# Patient Record
Sex: FEMALE | Race: WHITE | URBAN - METROPOLITAN AREA
[De-identification: names, ages, dates, MRNs, and addresses within clinical notes are randomized per-mention and may not be internally consistent; named-entity substitution may affect disease eponyms.]

---

## 2024-04-08 ENCOUNTER — TELEPHONE (OUTPATIENT)
Dept: OBGYN CLINIC | Age: 24
End: 2024-04-08

## 2024-04-08 NOTE — TELEPHONE ENCOUNTER
Received a call from patient's mother stating that patient is in the ER at [facility] and they are wanting to do a CT scan to check for a blood clot based on her labs. Informed mother that decisions about the patient's care should be made by her providers at that facility and their OBGYN since they have all of the information and that since we do not have all the information we are not able to recommend a treatment. Mother verbalized understanding.

## 2024-04-09 ENCOUNTER — ROUTINE PRENATAL (OUTPATIENT)
Dept: OBGYN CLINIC | Age: 24
End: 2024-04-09

## 2024-04-09 VITALS — DIASTOLIC BLOOD PRESSURE: 78 MMHG | SYSTOLIC BLOOD PRESSURE: 119 MMHG

## 2024-04-09 DIAGNOSIS — O99.212 OBESITY AFFECTING PREGNANCY IN SECOND TRIMESTER, UNSPECIFIED OBESITY TYPE: ICD-10-CM

## 2024-04-09 DIAGNOSIS — R82.71 GBS BACTERIURIA: ICD-10-CM

## 2024-04-09 DIAGNOSIS — R55 SYNCOPE, UNSPECIFIED SYNCOPE TYPE: ICD-10-CM

## 2024-04-09 DIAGNOSIS — J45.909 ASTHMA AFFECTING PREGNANCY IN SECOND TRIMESTER: ICD-10-CM

## 2024-04-09 DIAGNOSIS — O99.340 ANTEPARTUM MENTAL DISORDERS OF MOTHER: ICD-10-CM

## 2024-04-09 DIAGNOSIS — O09.92 HIGH-RISK PREGNANCY IN SECOND TRIMESTER: Primary | ICD-10-CM

## 2024-04-09 DIAGNOSIS — Z82.79 FAMILY HISTORY OF CONGENITAL HEART DEFECT: ICD-10-CM

## 2024-04-09 DIAGNOSIS — Z3A.20 20 WEEKS GESTATION OF PREGNANCY: ICD-10-CM

## 2024-04-09 DIAGNOSIS — O21.9 NAUSEA AND VOMITING DURING PREGNANCY: ICD-10-CM

## 2024-04-09 DIAGNOSIS — F43.10 PTSD (POST-TRAUMATIC STRESS DISORDER): ICD-10-CM

## 2024-04-09 DIAGNOSIS — O99.512 ASTHMA AFFECTING PREGNANCY IN SECOND TRIMESTER: ICD-10-CM

## 2024-04-09 DIAGNOSIS — F84.5 ASPERGER'S DISORDER: ICD-10-CM

## 2024-04-09 PROBLEM — F25.9 SCHIZOAFFECTIVE DISORDER (HCC): Status: RESOLVED | Noted: 2024-02-27 | Resolved: 2024-04-09

## 2024-04-09 RX ORDER — DIPHENHYDRAMINE HCL 25 MG
25 CAPSULE ORAL EVERY 6 HOURS PRN
COMMUNITY

## 2024-04-09 ASSESSMENT — PATIENT HEALTH QUESTIONNAIRE - PHQ9
1. LITTLE INTEREST OR PLEASURE IN DOING THINGS: SEVERAL DAYS
SUM OF ALL RESPONSES TO PHQ QUESTIONS 1-9: 1
2. FEELING DOWN, DEPRESSED OR HOPELESS: NOT AT ALL
SUM OF ALL RESPONSES TO PHQ9 QUESTIONS 1 & 2: 1
SUM OF ALL RESPONSES TO PHQ QUESTIONS 1-9: 1

## 2024-04-09 NOTE — PROGRESS NOTES
[facility] MATERNAL FETAL MEDICINE    [address] [place] [address]  P- [phone]  [phone]         MFM Consultation    Presents for evaluation of the following chief complaint(s):   Chief Complaint   Patient presents with    High Risk Pregnancy     Anxiety/Depression, Asperger's disorder, Asthma, BMI > 40, FH CHD, PTSD, Schizoaffective disorder         Yancy Cornejo (2000) is a 24 y.o.  at 20w4d with 2024, by Last Menstrual Period.     Patient is working part time at [employer].     Patients , José Manuel and her Mother, Anna Marie is present today.     Personal and family history reviewed and updated as indicated.     Pt is scheduled to see Primary OB ([facility]) on 24.    Reports HA's; daily- does not respond to Tylenol. Denies Edema. Denies Pre-eclamptic symptoms. No bleeding or LOF.  No contractions or cramping. No vaginal pressure recently. Reports good fetal movement. Reports constipation, recommendations reviewed.      Mood evaluated today based on discussion with pt and PHQ screen.      2024     5:11 PM   PHQ-9    Little interest or pleasure in doing things 1   Feeling down, depressed, or hopeless 0   PHQ-2 Score 1   PHQ-9 Total Score 1      Mood Reassuring today  As mood stable and depression/anxiety well-controlled, will not change medications today.    Addressed normal pregnancy complaints, reassured and offered suggestions for care  Reviewed gestational age precautions and activity goals/limitations  Nutritional counseling as well as specific goals based on current maternal and fetal status  Options for GERD, constipation, other common complaints reviewed.   Reviewed gestational age appropriate preventive care regarding communicable disease transmission and vaccines as appropriate (including flu, TDaP >28wk, RSV 32-36wk, and COVID.)  Mood counseling today      Vitals:    24 1709   BP: 119/78      Physical Exam  Applicable labs reviewed.   Please

## 2024-04-09 NOTE — PATIENT INSTRUCTIONS
If you have headaches in pregnancy-   Consider these  over the counter options (tylenol, caffeine, hydration, benadryl, mag oxide up to 800mg BID, riboflavin 400mg daily; angle-relief, excedrin migraine, allergy medications).     Other non-pharmacologic solutions - BeKool-type head patches, essential oils, dark room, warm compresses, mouthguard if jaw clenching/teeth grinding.    If no help, reach out to your OB or primary care practice.     It is helpful, to keep a headache calendar:  One idea is the abbey \"Migraine Deandre\" to log your headaches. You may find others you like better.     www.headachereliefguide.com for more information on headaches and interactive tools and helpful calculators including those to figure out how much water you need and evaluating your sleep score!    Watch out for headache red flags including headaches that wake you from sleep, early morning vomiting, immediate onset and 'worst headache of life', or headache associated with neurologic problem such as weakness of a limb, slurred speech, or facial droop.         Resources for Depression/Anxiety  Postpartum Support International (PSI).    PSI Warmline:  3-355-509-4PPD (1671).  WWW.POSTPARTUM.NET    Mom's IMPACTT  [email or web address]       In order to optimize maternal, fetal, and  health, we recommend the following vaccinations.   Flu- yearly (https://www.highriskpregnancyinfo.org/flu-facts-for-pregnancy)  Consider Covid vaccination/booster (https://www.highriskpregnancyinfo.org/covid-19-pregnancy)  TDaP after 28 weeks each pregnancy (https://www.highriskpregnancyinfo.org/tdap)  Consider RSV vaccine 32-36 weeks of pregnancy, if Sept- January.  (https://www.highriskpregnancyinfo.org/rsv)

## 2024-04-09 NOTE — ASSESSMENT & PLAN NOTE
Congenital heart defects occur in approximately 1% of pregnancies.  Congenital heart defects may occur due to multifactorial influences, chromosomal abnormalities, genetic syndromes or environmental exposures.  Isolated heart defects are generally multifactorial.  The overall prognosis is dependent on the severity of the heart defect, and whether or not it is due to an underlying chromosome or genetic problem.  Chromosomal and syndromic etiologies may be associated with other birth defects and mental retardation.  Risk for recurrence depends on etiology.    
 Low dose Aspirin  mg daily is recommended to be started at 12-16 weeks (some benefit seen with starting up to 28 weeks) for the prevention of preeclampsia  in high risk women. Consider stopping Aspirin at 36 weeks.  Recommend Vitamin D 2000IU daily and Calcium 1000mg daily to aid in protection of bones and teeth.  Recommend use of PNV daily with well-balanced diet.  Unless instructed otherwise, recommend continuation of physical activity throughout pregnancy.       Genetic counseling was performed by physician after reviewing patient's genetic history.    The patient's Down syndrome age associated risk, as well as, risks of additional aneuploidy and genetic syndromes, are reduced by approximately 50% with a normal anatomy ultrasound. Ultrasound alone does not rule out all abnormalities of genetics and development.     Maternal serum screening for aneuploidy was discussed with the patient including first trimester BELKYS-A/hCG, second trimester Quad screen (either in isolation or sequential with BELKYS-A) as well as non-invasive prenatal testing (NIPT) for aneuploidy from a maternal blood sample.  Positive predictive and negative predictive values for these tests were explained, questions answered. Patient understands that these are screening tests that only assesses risk for select abnormalities (trisomies 13, 18, and 21, and sex chromosome abnormalities (NIPT), as well as markers for placental health (BELKYS-A) and risk for open neural tube defects (quad)).  NIPT is designed for high risk populations, but should be considered by all patients who desire the current best option for screening for applicable genetic abnormalities.     Limitations of technology discussed based on maternal age, technical aspects of tests, and maternal BMI reviewed.  All questions answered and concerns discussed.     Patient elected to proceed with Ultrasound only with no serum screening.      
Asthma in pregnancy can be potentially life-threatening to mother and fetus.  Often, asthma will worsen in early third trimester. Recommend close monitoring and preventative therapy and aggressive treatment of exacerbations.      Patients with mild or well-controlled asthma will use an inhaled Beta-agonist (Albuterol type inhaler) prn or every 4-6 hours to control asthma. If patient requires use of rescue inhaler more than 2 times per week, worsening pulmonary function should be treated with the addition of an inhaled corticosteroid (ex. Flovent) BID.    For severe asthma or significant worsening of condition on the first 2 combined medications, patient should be assessed by her primary care physician. However, oral Prednisone 60 mgs daily for 1 week followed by a 10 day taper may be used.      Recommendation to decrease asthma triggers; Optimize seasonal allergy control.      testing twice weekly is recommended for severe asthma beginning at 32 weeks with growth assessment each month.     Avoid hemabate for treatment of uterine atony/pp hemorrhage.    
Preconception BMI ? 30 increases risk for pregnancy complications, including gestational diabetes, poor or accelerated fetal growth, hypertensive disorders of pregnancy, and abnormal labor progression. In addition, there is an increased risk of fetal demise, as well as congenital anomalies including neural tube defects, cardiac malformations, orofacial defects, and limb reduction abnormalities.     The risk for stillbirth increases with increasing obesity: class I obesity 1.3 [1.2-1.4], class II obesity 1.4 [1.3-1.6], class III obesity 1.9 [1.3-1.6]) and higher stillbirth risk in  obese women (1.9 [1.7-2.1]) than in  obese women (1.4 [1.3-1.5]). Among women with class III obesity (BMI ?40 kg/m2), the risk for stillbirth increased with advancing gestational age: 30 to 33 weeks, hazard and risk ratios 1.40 and 1.69, respectively; 37 to 39 weeks, hazard and risk ratios 3.20 and 2.95, respectively; and 40 to 42 weeks, hazard and risk ratios 3.30 and 8.95, respectively.           Recommend detailed first trimester ultrasound with NT at 12-13 weeks.   Recommend level II ultrasound for anatomy and fetal echo if prepregnancy BMI >30-35 based on AIUM guidelines.   Consider  testing beginning at 32-36 weeks due to risks of fetal demise, timing dependent on maternal and fetal comorbidities.   Early evaluation of insulin resistance with HgbA1c at initiation of care- if a1c > 5.5, then follow up with either \"2 step\" 1hr GCT/ 3hr GTT OR \"1 step\" 2hr GTT  Closely monitor blood pressure for development/worsening of hypertensive disorders of pregnancy.  Weight Gain Goal: <15 pounds, it is ok to stay same weight or lose as long as baby growing well  Dietary choices- low carb fine, avoid extreme keto (goal >50-75gm carb/day); not to use intermittent/prolonged fasting without specific discussion with physician.   Continue activity/exercise     The American College of Obstetricians and Gynecologists 
risk of  complications, and tapering or stopping antidepressants can increase the maternal risk of  relapse. It is generally agreed the risks of  depression (especially recurrent episodes) exceed the risks of  complications.     It is important to continue to monitor mood in the  period, as more than 20% women will struggle with depression or other mood issues in pregnancy/postpartum. Those with a history will be at a much higher risk for exacerbation with the hormonal fluctuations of this period.     Resources shared both in visit and by Ernestine

## 2024-04-10 ENCOUNTER — TELEPHONE (OUTPATIENT)
Dept: OBGYN CLINIC | Age: 24
End: 2024-04-10

## 2024-04-10 NOTE — TELEPHONE ENCOUNTER
Patient called and stated that she has been passing out multiples times a day and that she has been seen in the ER multiple times. She wants to have time off work for it. Passed her message along to Dr Salmon.

## 2024-04-10 NOTE — TELEPHONE ENCOUNTER
Called patient and informed her of Dr Salmon's response:  \"I can't do a get out of work for the forseeable future letter but I am willing to give her a letter for this week and see how she does. If symptoms continue will need a followup visit sooner.\"  Patient verbalizes understanding. Letter sent to patient.

## 2024-04-15 ENCOUNTER — TELEPHONE (OUTPATIENT)
Dept: OBGYN CLINIC | Age: 24
End: 2024-04-15
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
## 2024-04-15 NOTE — TELEPHONE ENCOUNTER
Patient called and stated that she is still passing out. Scheduled for a problem visit with Dr Salmon.

## 2024-04-16 ENCOUNTER — OFFICE VISIT (OUTPATIENT)
Dept: OBGYN CLINIC | Age: 24
End: 2024-04-16

## 2024-04-16 VITALS
HEART RATE: 105 BPM | BODY MASS INDEX: 39.94 KG/M2 | SYSTOLIC BLOOD PRESSURE: 106 MMHG | WEIGHT: 240 LBS | DIASTOLIC BLOOD PRESSURE: 74 MMHG | OXYGEN SATURATION: 95 %

## 2024-04-16 DIAGNOSIS — Z3A.21 21 WEEKS GESTATION OF PREGNANCY: ICD-10-CM

## 2024-04-16 DIAGNOSIS — R55 SYNCOPE, UNSPECIFIED SYNCOPE TYPE: ICD-10-CM

## 2024-04-16 DIAGNOSIS — O21.9 NAUSEA AND VOMITING DURING PREGNANCY: ICD-10-CM

## 2024-04-16 DIAGNOSIS — O09.92 HIGH-RISK PREGNANCY IN SECOND TRIMESTER: Primary | ICD-10-CM

## 2024-04-16 LAB
ALBUMIN SERPL-MCNC: 3.2 G/DL (ref 3.5–5)
ALBUMIN/GLOB SERPL: 0.8 (ref 0.4–1.6)
ALP SERPL-CCNC: 56 U/L (ref 50–136)
ALT SERPL-CCNC: 14 U/L (ref 12–65)
ANION GAP SERPL CALC-SCNC: 4 MMOL/L (ref 2–11)
AST SERPL-CCNC: 14 U/L (ref 15–37)
BILIRUB SERPL-MCNC: 0.2 MG/DL (ref 0.2–1.1)
BUN SERPL-MCNC: 7 MG/DL (ref 6–23)
CALCIUM SERPL-MCNC: 9.5 MG/DL (ref 8.3–10.4)
CHLORIDE SERPL-SCNC: 108 MMOL/L (ref 103–113)
CO2 SERPL-SCNC: 25 MMOL/L (ref 21–32)
CREAT SERPL-MCNC: 0.6 MG/DL (ref 0.6–1)
ERYTHROCYTE [DISTWIDTH] IN BLOOD BY AUTOMATED COUNT: 13.5 % (ref 11.9–14.6)
GLOBULIN SER CALC-MCNC: 3.8 G/DL (ref 2.8–4.5)
GLUCOSE SERPL-MCNC: 102 MG/DL (ref 65–100)
HCT VFR BLD AUTO: 35.8 % (ref 35.8–46.3)
HGB BLD-MCNC: 12.3 G/DL (ref 11.7–15.4)
MAGNESIUM SERPL-MCNC: 1.8 MG/DL (ref 1.8–2.4)
MCH RBC QN AUTO: 31.2 PG (ref 26.1–32.9)
MCHC RBC AUTO-ENTMCNC: 34.4 G/DL (ref 31.4–35)
MCV RBC AUTO: 90.9 FL (ref 82–102)
NRBC # BLD: 0 K/UL (ref 0–0.2)
PLATELET # BLD AUTO: 376 K/UL (ref 150–450)
PMV BLD AUTO: 10.2 FL (ref 9.4–12.3)
POTASSIUM SERPL-SCNC: 3.8 MMOL/L (ref 3.5–5.1)
PROT SERPL-MCNC: 7 G/DL (ref 6.3–8.2)
RBC # BLD AUTO: 3.94 M/UL (ref 4.05–5.2)
SODIUM SERPL-SCNC: 137 MMOL/L (ref 136–146)
WBC # BLD AUTO: 11.1 K/UL (ref 4.3–11.1)

## 2024-04-16 PROCEDURE — 0502F SUBSEQUENT PRENATAL CARE: CPT | Performed by: NURSE PRACTITIONER

## 2024-04-19 ENCOUNTER — OFFICE VISIT (OUTPATIENT)
Dept: NEUROLOGY | Age: 24
End: 2024-04-19
Payer: COMMERCIAL

## 2024-04-19 ENCOUNTER — OFFICE VISIT (OUTPATIENT)
Dept: NEUROLOGY | Age: 24
End: 2024-04-19

## 2024-04-19 ENCOUNTER — INITIAL CONSULT (OUTPATIENT)
Age: 24
End: 2024-04-19
Payer: COMMERCIAL

## 2024-04-19 VITALS
BODY MASS INDEX: 40.45 KG/M2 | WEIGHT: 242.8 LBS | HEIGHT: 65 IN | SYSTOLIC BLOOD PRESSURE: 108 MMHG | DIASTOLIC BLOOD PRESSURE: 64 MMHG | HEART RATE: 100 BPM

## 2024-04-19 VITALS
DIASTOLIC BLOOD PRESSURE: 75 MMHG | BODY MASS INDEX: 39.94 KG/M2 | HEART RATE: 108 BPM | WEIGHT: 240 LBS | OXYGEN SATURATION: 97 % | SYSTOLIC BLOOD PRESSURE: 105 MMHG

## 2024-04-19 DIAGNOSIS — R55 SYNCOPE, UNSPECIFIED SYNCOPE TYPE: Primary | ICD-10-CM

## 2024-04-19 PROCEDURE — G8419 CALC BMI OUT NRM PARAM NOF/U: HCPCS | Performed by: PSYCHIATRY & NEUROLOGY

## 2024-04-19 PROCEDURE — G8427 DOCREV CUR MEDS BY ELIG CLIN: HCPCS | Performed by: INTERNAL MEDICINE

## 2024-04-19 PROCEDURE — 1036F TOBACCO NON-USER: CPT | Performed by: INTERNAL MEDICINE

## 2024-04-19 PROCEDURE — G8419 CALC BMI OUT NRM PARAM NOF/U: HCPCS | Performed by: INTERNAL MEDICINE

## 2024-04-19 PROCEDURE — G8427 DOCREV CUR MEDS BY ELIG CLIN: HCPCS | Performed by: PSYCHIATRY & NEUROLOGY

## 2024-04-19 PROCEDURE — 99204 OFFICE O/P NEW MOD 45 MIN: CPT | Performed by: INTERNAL MEDICINE

## 2024-04-19 PROCEDURE — 99204 OFFICE O/P NEW MOD 45 MIN: CPT | Performed by: PSYCHIATRY & NEUROLOGY

## 2024-04-19 PROCEDURE — 1036F TOBACCO NON-USER: CPT | Performed by: PSYCHIATRY & NEUROLOGY

## 2024-04-19 ASSESSMENT — ENCOUNTER SYMPTOMS
ALLERGIC/IMMUNOLOGIC NEGATIVE: 1
EYES NEGATIVE: 1
RESPIRATORY NEGATIVE: 1
GASTROINTESTINAL NEGATIVE: 1

## 2024-04-19 NOTE — PROGRESS NOTES
ELECTROENCEPHALOGRAM FOR [facility] NEUROLOGY    EEG: Routine  Pt Class: Outpatient    DATE(s) OF EE24  DATE OF REPORT: 24    MRN: [id number]  YOB: 2000  EEG No.   ICD-10: R55 - Syncope and collapse  CPT Code: 80861 (20-40 minutes, awake and drowsy)  CSN: [id number]    HISTORY: syncope vs seizure    MEDICATIONS THAT COULD AFFECT EEG: no ASM    TECHNICAL SUMMARY  This is a digital EEG recorded with all input channels reviewed with bipolar and referential montages using the modified combinatorial system nomenclature.    DESCRIPTION OF RECORD AND EVENTS  During the maximally alert state a 11-12 Hz posterior dominant rhythm was seen that was symmetric, reactive to eye opening and well regulated. More anteriorly, low voltage frontocentral beta predominated. Minimal myogenic artifact was seen with corresponding movements. Drowsiness was characterized by alpha attenuation and increased symmetric frontocentral theta activity. Vertex sharp transients were seen. Stage 2 sleep was not reached.     HV: Hyperventilation was not performed.     PHOTIC STIMULATION: Photic stimulation was done from 1-21 Hz; photic driving was seen; photoparoxysmal responses were absent.    ELECTROCARDIOGRAM: Normal Sinus Rhythm, SpO2 97    IMPRESSION: Normal EEG in Awake and Drowsy states.    CLINICAL CORRELATION: This EEG is normal for a patient of this age during awake and drowsy states. There are no asymmetries, epileptiform discharges or electrographic seizures.     An EEG without epileptiform discharges does not exclude the possibility of epilepsy. If the clinical suspicion of epilepsy remains, consider additional EEG recordings.      Michelet Torrez MD  [facility] Neurology  [address], [address]  [place] [address]  Phone: [phone]  Fax: [phone]

## 2024-04-19 NOTE — PROGRESS NOTES
[facility] NEUROLOGY  [address], [address]  [place] [address]  Phone: [phone] Fax [phone]  Dr. Jairo Bell      4/19/2024  Yancy Cornejo     Patient is referred by the following provider for consultation regarding as below:       I reviewed the available records and notes and have examined patient with the following findings:     Chief Complaint:  No chief complaint on file.         HPI: This is a right handed 24 y.o.  female who is currently 22 weeks pregnant and carries a history of PTSD as a Buerger's syndrome syncope as a teenager schizoaffective disorder.    This patient does have a history of having recurrent episodes of syncope as a child that were very similar to these episodes.  Currently she has been passing out about every 1 or 2 times per day all of these episodes seem to be about the same where she states that she gets stars in her eyes then she kind of blacks out or loses her vision but she is still conscious and then goes into syncope and passes out.  At no time has she had any seizure activity at no time as she lost bowel or bladder there is no postictal state.  No witnessed seizure activity.  Though her mother is here with her today and she does this once or twice a day her mom has not seen any of these episodes except when she was a teenager.  Her  does see these and is frequently with her and tries to shake her awake legs or down puts her feet up.  But he has not seen any seizure activity.  There is a family history of an aunt on her mother side and her grandmother with seizures.  But those were febrile.  There was a question of narcolepsy but the patient really does not describe excessive daytime hypersomnia, she does not describe typical cataplexy events.  There is no hypnagogic or hypnopompic hallucinations.  She has had a history of hallucinations in the past.  There is no sleep paralysis or sleep attacks.  Certainly could be considered atypical but

## 2024-04-23 ENCOUNTER — ROUTINE PRENATAL (OUTPATIENT)
Dept: OBGYN CLINIC | Age: 24
End: 2024-04-23

## 2024-04-23 VITALS — SYSTOLIC BLOOD PRESSURE: 120 MMHG | WEIGHT: 240.8 LBS | DIASTOLIC BLOOD PRESSURE: 78 MMHG | BODY MASS INDEX: 40.07 KG/M2

## 2024-04-23 DIAGNOSIS — R55 SYNCOPE, UNSPECIFIED SYNCOPE TYPE: ICD-10-CM

## 2024-04-23 DIAGNOSIS — O99.212 OBESITY AFFECTING PREGNANCY IN SECOND TRIMESTER, UNSPECIFIED OBESITY TYPE: ICD-10-CM

## 2024-04-23 DIAGNOSIS — F84.5 ASPERGER'S DISORDER: ICD-10-CM

## 2024-04-23 DIAGNOSIS — O09.92 HIGH-RISK PREGNANCY IN SECOND TRIMESTER: Primary | ICD-10-CM

## 2024-04-23 PROCEDURE — 0502F SUBSEQUENT PRENATAL CARE: CPT | Performed by: OBSTETRICS & GYNECOLOGY

## 2024-04-23 NOTE — PROGRESS NOTES
OB return  Yancy Cornejo is a 24 y.o. female   with 22w4d IUP with Estimated Date of Delivery: 24 presenting to the clinic as a routine OB.   Denies any complaints.    Problem list reviewed and updated    Pregnancy complicated by:  1. Asperger's disorder  2. Schizoaffective - managed by psychiatry and therapy, Zoloft 100mg and Vraylar 4.5mg  3. Insomnia- takes hydroxyzine, recommend to switch to benadryl or unisom  4. GBS bacteruria  5. Obesity- BMI 40, MFM referral  6. Syncope- referral to cardiology and neurology, hx of normal sleep study as a teenager, workup including EEG and holter monitor  7. Family hx of congenital heart defects- with 1st paternal cousin with CHD, required surgery, normal anatomy echo but limited  8. Asthma- allergy and exercise induced, albuterol inhaler prn    Physical Examination:  /78   Wt 109.2 kg (240 lb 12.8 oz)   LMP 2023   BMI 40.07 kg/m²    Gen: AAOx3  Ab: soft, NTTP, gravid uterus  Skin: no edema    Plan:  --RTC 4 weeks with glucola

## 2024-04-29 ASSESSMENT — ENCOUNTER SYMPTOMS
SHORTNESS OF BREATH: 0
ABDOMINAL PAIN: 0

## 2024-04-29 NOTE — PROGRESS NOTES
[facility] CARDIOLOGY  [address] [place] [address], [address]  [place] [address]  PHONE: [phone]      24    NAME:  Yancy Cornejo  : 2000  MRN: [id number]         SUBJECTIVE:   Yancy Cornejo is a 24 y.o. female seen for a consultation visit regarding the following:     Chief Complaint   Patient presents with    Consultation      Syncope, unspecified syncope type  21 weeks gestation of pregnancy                HPI:  Consultation is requested by Kevin Valerio MD for evaluation of Consultation ( Syncope, unspecified syncope type/21 weeks gestation of pregnancy//)   .    Ms. Cornejo presents today for follow-up.  She  presents today for evaluation of syncope.  Patient had a recent episode of syncope while she was at work, lost consciousness.  Was not associated with an abrupt postural change.  She has had significant dizziness and lightheadedness recently.  She has had syncope previously, not associate with pregnancy although it has been sometime.  She notes since has been in her second trimester pregnancy, her blood pressure has been lower, heart rates been little elevated.  She otherwise has no worrisome symptoms or complaints today.  She is a non-smoker.  Has not drinking any caffeine.  She is on no antihypertensive medications.  No significant family history of heart disease.                Past Medical History, Past Surgical History, Family history, Social History, and Medications were all reviewed with the patient today and updated as necessary.     Prior to Admission medications    Medication Sig Start Date End Date Taking? Authorizing Provider   Prenatal Vit-Fe Fumarate-FA (PRENATAL PO) Take by mouth   Yes Kathie Guzman MD   diphenhydrAMINE (BENADRYL) 25 MG capsule Take 1 capsule by mouth every 6 hours as needed for Itching   Yes Kathie Guzman MD   famotidine (PEPCID) 20 MG tablet Take 1 tablet by mouth 2 times daily 24  Yes Ceci Benítez MD   ondansetron (ZOFRAN) 4 MG

## 2024-05-07 ENCOUNTER — TELEPHONE (OUTPATIENT)
Dept: OBGYN CLINIC | Age: 24
End: 2024-05-07

## 2024-05-07 ENCOUNTER — HOSPITAL ENCOUNTER (EMERGENCY)
Age: 24
Discharge: HOME | End: 2024-05-07
Payer: COMMERCIAL

## 2024-05-07 ENCOUNTER — APPOINTMENT (OUTPATIENT)
Dept: ULTRASOUND IMAGING | Age: 24
End: 2024-05-07
Payer: COMMERCIAL

## 2024-05-07 VITALS
WEIGHT: 240 LBS | SYSTOLIC BLOOD PRESSURE: 116 MMHG | DIASTOLIC BLOOD PRESSURE: 73 MMHG | TEMPERATURE: 98.4 F | HEIGHT: 65 IN | BODY MASS INDEX: 39.99 KG/M2 | RESPIRATION RATE: 27 BRPM | OXYGEN SATURATION: 99 % | HEART RATE: 90 BPM

## 2024-05-07 DIAGNOSIS — M79.662 PAIN OF LEFT CALF: Primary | ICD-10-CM

## 2024-05-07 LAB
ALBUMIN SERPL-MCNC: 3.5 G/DL (ref 3.5–5)
ALBUMIN/GLOB SERPL: 1.3 (ref 0.4–1.6)
ALP SERPL-CCNC: 72 U/L (ref 45–117)
ALT SERPL-CCNC: 10 U/L (ref 13–61)
ANION GAP SERPL CALC-SCNC: 15 MMOL/L (ref 2–11)
APPEARANCE UR: ABNORMAL
AST SERPL-CCNC: 17 U/L (ref 15–37)
BASE DEFICIT BLDV-SCNC: 1.1 MMOL/L
BASOPHILS # BLD: 0 K/UL (ref 0–0.2)
BASOPHILS NFR BLD: 0 % (ref 0–2)
BILIRUB SERPL-MCNC: 0.3 MG/DL (ref 0.2–1.1)
BILIRUB UR QL: NEGATIVE
BUN SERPL-MCNC: 5 MG/DL (ref 6–23)
CALCIUM SERPL-MCNC: 9.4 MG/DL (ref 8.3–10.4)
CHLORIDE SERPL-SCNC: 103 MMOL/L (ref 98–107)
CO2 SERPL-SCNC: 20 MMOL/L (ref 21–32)
COLOR UR: ABNORMAL
CREAT SERPL-MCNC: 0.44 MG/DL (ref 0.6–1)
DIFFERENTIAL METHOD BLD: ABNORMAL
EOSINOPHIL # BLD: 0.2 K/UL (ref 0–0.8)
EOSINOPHIL NFR BLD: 1 % (ref 0.5–7.8)
ERYTHROCYTE [DISTWIDTH] IN BLOOD BY AUTOMATED COUNT: 13 % (ref 11.9–14.6)
GLOBULIN SER CALC-MCNC: 2.6 G/DL (ref 2.8–4.5)
GLUCOSE SERPL-MCNC: 158 MG/DL (ref 65–100)
GLUCOSE UR STRIP.AUTO-MCNC: 100 MG/DL
HCO3 BLDV-SCNC: 23.6 MMOL/L (ref 23–28)
HCT VFR BLD AUTO: 33 % (ref 35.8–46.3)
HGB BLD-MCNC: 11.5 G/DL (ref 11.7–15.4)
HGB UR QL STRIP: NEGATIVE
IMM GRANULOCYTES # BLD AUTO: 0 K/UL (ref 0–0.5)
IMM GRANULOCYTES NFR BLD AUTO: 0 % (ref 0–5)
KETONES UR QL STRIP.AUTO: 15 MG/DL
LACTATE SERPL-SCNC: 1.8 MMOL/L (ref 0.5–2)
LEUKOCYTE ESTERASE UR QL STRIP.AUTO: NEGATIVE
LYMPHOCYTES # BLD: 1.4 K/UL (ref 0.5–4.6)
LYMPHOCYTES NFR BLD: 13 % (ref 13–44)
MCH RBC QN AUTO: 30.8 PG (ref 26.1–32.9)
MCHC RBC AUTO-ENTMCNC: 34.8 G/DL (ref 31.4–35)
MCV RBC AUTO: 88.5 FL (ref 82–102)
MONOCYTES # BLD: 0.5 K/UL (ref 0.1–1.3)
MONOCYTES NFR BLD: 5 % (ref 4–12)
NEUTS SEG # BLD: 8.6 K/UL (ref 1.7–8.2)
NEUTS SEG NFR BLD: 80 % (ref 43–78)
NITRITE UR QL STRIP.AUTO: NEGATIVE
NRBC # BLD: 0 K/UL (ref 0–0.2)
PCO2 BLDV: 38.2 MMHG (ref 41–51)
PH BLDV: 7.4 (ref 7.32–7.42)
PH UR STRIP: 7 (ref 5–9)
PLATELET # BLD AUTO: 326 K/UL (ref 150–450)
PMV BLD AUTO: 9.4 FL (ref 9.4–12.3)
PO2 BLDV: 32 MMHG
POTASSIUM SERPL-SCNC: 3.9 MMOL/L (ref 3.5–5.1)
PROT SERPL-MCNC: 6.1 G/DL (ref 6.4–8.2)
PROT UR STRIP-MCNC: NEGATIVE MG/DL
RBC # BLD AUTO: 3.73 M/UL (ref 4.05–5.2)
SAO2 % BLDV: 62.4 % (ref 65–88)
SERVICE CMNT-IMP: ABNORMAL
SODIUM SERPL-SCNC: 138 MMOL/L (ref 133–143)
SP GR UR REFRACTOMETRY: 1.02 (ref 1–1.02)
SPECIMEN TYPE: ABNORMAL
TROPONIN T SERPL HS-MCNC: <6 NG/L (ref 0–14)
UROBILINOGEN UR QL STRIP.AUTO: 0.2 EU/DL (ref 0.2–1)
WBC # BLD AUTO: 10.7 K/UL (ref 4.3–11.1)

## 2024-05-07 PROCEDURE — 96361 HYDRATE IV INFUSION ADD-ON: CPT

## 2024-05-07 PROCEDURE — 93970 EXTREMITY STUDY: CPT

## 2024-05-07 PROCEDURE — 6370000000 HC RX 637 (ALT 250 FOR IP)

## 2024-05-07 PROCEDURE — 96360 HYDRATION IV INFUSION INIT: CPT

## 2024-05-07 PROCEDURE — 81003 URINALYSIS AUTO W/O SCOPE: CPT

## 2024-05-07 PROCEDURE — 85025 COMPLETE CBC W/AUTO DIFF WBC: CPT

## 2024-05-07 PROCEDURE — 82803 BLOOD GASES ANY COMBINATION: CPT

## 2024-05-07 PROCEDURE — 99284 EMERGENCY DEPT VISIT MOD MDM: CPT

## 2024-05-07 PROCEDURE — 2580000003 HC RX 258

## 2024-05-07 PROCEDURE — 80053 COMPREHEN METABOLIC PANEL: CPT

## 2024-05-07 PROCEDURE — 83605 ASSAY OF LACTIC ACID: CPT

## 2024-05-07 PROCEDURE — 84484 ASSAY OF TROPONIN QUANT: CPT

## 2024-05-07 RX ORDER — ACETAMINOPHEN 325 MG/1
650 TABLET ORAL
Status: COMPLETED | OUTPATIENT
Start: 2024-05-07 | End: 2024-05-07

## 2024-05-07 RX ORDER — 0.9 % SODIUM CHLORIDE 0.9 %
1000 INTRAVENOUS SOLUTION INTRAVENOUS
Status: COMPLETED | OUTPATIENT
Start: 2024-05-07 | End: 2024-05-07

## 2024-05-07 RX ADMIN — ACETAMINOPHEN 650 MG: 325 TABLET, FILM COATED ORAL at 11:35

## 2024-05-07 RX ADMIN — SODIUM CHLORIDE 1000 ML: 9 INJECTION, SOLUTION INTRAVENOUS at 11:36

## 2024-05-07 ASSESSMENT — PAIN SCALES - GENERAL: PAINLEVEL_OUTOF10: 6

## 2024-05-07 ASSESSMENT — PAIN - FUNCTIONAL ASSESSMENT: PAIN_FUNCTIONAL_ASSESSMENT: 0-10

## 2024-05-07 ASSESSMENT — PAIN DESCRIPTION - ORIENTATION: ORIENTATION: LEFT;LOWER;POSTERIOR

## 2024-05-07 NOTE — ED TRIAGE NOTES
Pt ambulatory to triage for reports of L lower leg pain x 2-3 days. Pt states area is tender to touch & has been constant. Pt reports she is almost 25 weeks pregnant. Pt denies extended periods of sitting. Pt reports she has also had some syncopal episodes in the past few weeks. Pt reports mild dyspnea with exertion.

## 2024-05-07 NOTE — TELEPHONE ENCOUNTER
Patient calls stating she has had sudden pain in her leg and is worried she has a blood clot. It hurts when she touches it and when she walks. She says it has been going on for two to three days. Says it is not swollen and is not hot to the touch. Informed patient to try taking a tylenol and see if it helps with her pain. Patient verbalized understanding.  Patient states that she is going to an urgent care. She wants to be seen to evaluate her leg and her dizziness.

## 2024-05-07 NOTE — DISCHARGE INSTRUCTIONS
Please continue Tylenol as needed for pain.  You can use a heating pad and massage as well for discomfort.  Please follow-up with your OB/GYN as well as your primary care provider.    Return to the ED immediately with any new or worsening symptoms.

## 2024-05-07 NOTE — ED NOTES
Patient mobility status  with no difficulty. Provider aware     I have reviewed discharge instructions with the patient.  The patient verbalized understanding.    Patient left ED via Discharge Method: ambulatory to Home with  mother .    Opportunity for questions and clarification provided.     Patient given 0 scripts.

## 2024-05-07 NOTE — ED PROVIDER NOTES
Basophils % 0 0.0 - 2.0 %    Immature Granulocytes % 0 0.0 - 5.0 %    Neutrophils Absolute 8.6 (H) 1.7 - 8.2 K/UL    Lymphocytes Absolute 1.4 0.5 - 4.6 K/UL    Monocytes Absolute 0.5 0.1 - 1.3 K/UL    Eosinophils Absolute 0.2 0.0 - 0.8 K/UL    Basophils Absolute 0.0 0.0 - 0.2 K/UL    Immature Granulocytes Absolute 0.0 0.0 - 0.5 K/UL   Comprehensive Metabolic Panel   Result Value Ref Range    Sodium 138 133 - 143 mmol/L    Potassium 3.9 3.5 - 5.1 mmol/L    Chloride 103 98 - 107 mmol/L    CO2 20 (L) 21 - 32 mmol/L    Anion Gap 15 (H) 2 - 11 mmol/L    Glucose 158 (H) 65 - 100 mg/dL    BUN 5 (L) 6 - 23 MG/DL    Creatinine 0.44 (L) 0.6 - 1.0 MG/DL    Est, Glom Filt Rate >90 >60 ml/min/1.73m2    Calcium 9.4 8.3 - 10.4 MG/DL    Total Bilirubin 0.3 0.2 - 1.1 MG/DL    ALT 10 (L) 13.0 - 61.0 U/L    AST 17 15 - 37 U/L    Alk Phosphatase 72 45.0 - 117.0 U/L    Total Protein 6.1 (L) 6.4 - 8.2 g/dL    Albumin 3.5 3.5 - 5.0 g/dL    Globulin 2.6 (L) 2.8 - 4.5 g/dL    Albumin/Globulin Ratio 1.3 0.4 - 1.6     Troponin   Result Value Ref Range    Troponin T <6.0 0 - 14 ng/L   Urinalysis w rflx microscopic   Result Value Ref Range    Color, UA TONY      Appearance SLIGHTLY CLOUDY      Specific Gravity, UA 1.020 1.001 - 1.023      pH, Urine 7.0 5.0 - 9.0      Protein, UA Negative NEG mg/dL    Glucose, Ur 100 mg/dL    Ketones, Urine 15 (A) NEG mg/dL    Bilirubin, Urine Negative NEG      Blood, Urine Negative NEG      Urobilinogen, Urine 0.2 0.2 - 1.0 EU/dL    Nitrite, Urine Negative NEG      Leukocyte Esterase, Urine Negative NEG     Lactic Acid   Result Value Ref Range    Lactic Acid 1.8 0.5 - 2.0 mmol/L   Venous Blood Gas, POC   Result Value Ref Range    PH, VENOUS (POC) 7.40 7.32 - 7.42      PCO2, [place], POC 38.2 (L) 41 - 51 MMHG    PO2, VENOUS (POC) 32 mmHg    HCO3, Venous 23.6 23 - 28 MMOL/L    SO2, VENOUS (POC) 62.4 (L) 65 - 88 %    Base Deficit, Venous 1.1 mmol/L    Specimen type: VENOUS BLOOD      Performed by: Elva

## 2024-05-08 ENCOUNTER — ROUTINE PRENATAL (OUTPATIENT)
Dept: OBGYN CLINIC | Age: 24
End: 2024-05-08

## 2024-05-08 VITALS — SYSTOLIC BLOOD PRESSURE: 112 MMHG | DIASTOLIC BLOOD PRESSURE: 68 MMHG

## 2024-05-08 DIAGNOSIS — O99.212 OBESITY AFFECTING PREGNANCY IN SECOND TRIMESTER, UNSPECIFIED OBESITY TYPE: ICD-10-CM

## 2024-05-08 DIAGNOSIS — J45.909 ASTHMA AFFECTING PREGNANCY IN SECOND TRIMESTER: ICD-10-CM

## 2024-05-08 DIAGNOSIS — Z82.79 FAMILY HISTORY OF CONGENITAL HEART DEFECT: ICD-10-CM

## 2024-05-08 DIAGNOSIS — F43.10 PTSD (POST-TRAUMATIC STRESS DISORDER): ICD-10-CM

## 2024-05-08 DIAGNOSIS — O99.512 ASTHMA AFFECTING PREGNANCY IN SECOND TRIMESTER: ICD-10-CM

## 2024-05-08 DIAGNOSIS — O99.340 ANTEPARTUM MENTAL DISORDERS OF MOTHER: ICD-10-CM

## 2024-05-08 DIAGNOSIS — F84.5 ASPERGER'S DISORDER: ICD-10-CM

## 2024-05-08 DIAGNOSIS — Z3A.24 24 WEEKS GESTATION OF PREGNANCY: ICD-10-CM

## 2024-05-08 DIAGNOSIS — O21.9 NAUSEA AND VOMITING DURING PREGNANCY: ICD-10-CM

## 2024-05-08 DIAGNOSIS — O09.92 HIGH-RISK PREGNANCY IN SECOND TRIMESTER: Primary | ICD-10-CM

## 2024-05-08 DIAGNOSIS — R55 SYNCOPE, UNSPECIFIED SYNCOPE TYPE: ICD-10-CM

## 2024-05-08 DIAGNOSIS — R82.71 GBS BACTERIURIA: ICD-10-CM

## 2024-05-08 RX ORDER — CARIPRAZINE 4.5 MG/1
4.5 CAPSULE, GELATIN COATED ORAL DAILY
COMMUNITY
Start: 2024-04-23

## 2024-05-08 ASSESSMENT — PATIENT HEALTH QUESTIONNAIRE - PHQ9
SUM OF ALL RESPONSES TO PHQ QUESTIONS 1-9: 2
SUM OF ALL RESPONSES TO PHQ9 QUESTIONS 1 & 2: 2
SUM OF ALL RESPONSES TO PHQ QUESTIONS 1-9: 2
SUM OF ALL RESPONSES TO PHQ QUESTIONS 1-9: 2
1. LITTLE INTEREST OR PLEASURE IN DOING THINGS: SEVERAL DAYS
SUM OF ALL RESPONSES TO PHQ QUESTIONS 1-9: 2
2. FEELING DOWN, DEPRESSED OR HOPELESS: SEVERAL DAYS

## 2024-05-08 NOTE — PATIENT INSTRUCTIONS
Resources for Depression/Anxiety  Postpartum Support International (PSI).    PSI Warmline:  5-594-172-4PPD (8361).  WWW.POSTPARTUM.NET    Mom's IMPACTT  [email or web address]       In order to optimize maternal, fetal, and  health, we recommend the following vaccinations.   Flu- yearly (https://www.highriskpregnancyinfo.org/flu-facts-for-pregnancy)  Consider Covid vaccination/booster (https://www.highriskpregnancyinfo.org/covid-19-pregnancy)  TDaP after 28 weeks each pregnancy (https://www.highriskpregnancyinfo.org/tdap)  Consider RSV vaccine 32-36 weeks of pregnancy, if Sept- January.  (https://www.highriskpregnancyinfo.org/rsv)

## 2024-05-08 NOTE — PROGRESS NOTES
[facility] MATERNAL FETAL MEDICINE    [address] [place] [address]  P- [phone]  [phone]       MFM Follow-up Visit  Yancy Cornejo (2000) is a 24 y.o.  at 24w5d with 2024, by Last Menstrual Period.   Presents for evaluation of the following chief complaint(s):   Chief Complaint   Patient presents with    High Risk Pregnancy     Anxiety/Depression, Asperger's disorder, Asthma, BMI > 40, FH CHD, PTSD, Schizoaffective disorder       Patient is not currently working due to driving restrictions.     Patient is scheduled to see Primary OB ([facility]) on 24.    Patient's Mother and Grandmother present today. Expecting baby girl, Unique.     Interval history since prior appt reviewed and updated as indicated.      No HAs, edema.  Denies preeclamptic symptoms.  Reports good fetal movement.  No bleeding, LOF, cramping, ctxs, or vaginal pressure.        Mood evaluated today based on discussion with pt and PHQ screen.       2024     4:44 PM   PHQ-9    Little interest or pleasure in doing things 1   Feeling down, depressed, or hopeless 1   PHQ-2 Score 2   PHQ-9 Total Score 2      Mood Reassuring today  Offered OB Care Coordination with Marilin Palmer LCSW to aid in obtaining mental health care.     Addressed normal pregnancy complaints, reassured and offered suggestions for care  Reviewed gestational age precautions and activity goals/limitations  Nutritional counseling as well as specific goals based on current maternal and fetal status  Options for GERD, constipation, other common complaints reviewed.   Reviewed gestational age appropriate preventive care regarding communicable disease transmission and vaccines as appropriate (including flu, TDaP >28wk, RSV 32-36wk, and COVID.)  Mood counseling today    Exam:     Vitals:    24 1627   BP: 112/68      Physical Exam  Applicable labs reviewed.   Please see formal ultrasound report under imaging tab.

## 2024-05-10 ENCOUNTER — FOLLOWUP TELEPHONE ENCOUNTER (OUTPATIENT)
Dept: CASE MANAGEMENT | Age: 24
End: 2024-05-10

## 2024-05-10 NOTE — TELEPHONE ENCOUNTER
Request received from [facility] to reach out to patient.      Phone call to patient at [phone].  No answer; message left requesting call back.    FREEMAN Hilario, [facility]-C  [facility]   [phone]

## 2024-05-13 ENCOUNTER — FOLLOWUP TELEPHONE ENCOUNTER (OUTPATIENT)
Dept: CASE MANAGEMENT | Age: 24
End: 2024-05-13

## 2024-05-13 NOTE — TELEPHONE ENCOUNTER
Phone call to patient at [phone].  Introduction made as OB Care Coordinator.  Patient agreeable to continue conversation.    Demographics confirmed.  Patient states that she lives with her  (José Manuel), cat, and dog.      Patient is currently taking Zoloft and Vraylar.  These medications are prescribed by Dr. Potter with [facility].  Per patient, her [facility] policy is not accepted at [facility].  She also has [employer] secondary, but this policy is not accepted either.  YULY educated patient on mental health support (medication management) available thru [facility]'s Mom's IMPACTT Program ([phone]).  Patient agreeable for  to make online referral today.    Patient will then be contacted by a [facility]  Care Coordinator to be enrolled in support services.  Patient currently sees her therapist at [facility] bi-weekly.  She has been working with the same therapist since she was 16 y/o.  Per patient, [facility] doesn't take [facility], and they are in the credentialing process with [employer].     Patient denied any additional needs and is agreeable to a follow-up phone call next week.  YULY encouraged patient to reach out if any needs/questions arise.    Marilin Palmer, NAZARIO-PATY, PMH-C  [facility]   [phone]

## 2024-05-21 ENCOUNTER — FOLLOWUP TELEPHONE ENCOUNTER (OUTPATIENT)
Dept: CASE MANAGEMENT | Age: 24
End: 2024-05-21

## 2024-05-21 NOTE — TELEPHONE ENCOUNTER
Phone call to patient at [phone].     Per patient, she has an appointment with a psychiatrist at [facility] Mom's [facility]ACTT Program on Thursday.  Additionally, patient continues to see her therapist at [facility].     Patient denied any additional needs at this time.  SW encouraged patient to reach out if any needs/questions arise.     Marilin Palmer, NAZARIO-PATY, PM-C  [facility]   [phone]

## 2024-05-23 NOTE — PROGRESS NOTES
OB return  Yancy Cornejo is a 24 y.o. female   with 27w0d IUP with Estimated Date of Delivery: 24 presenting to the clinic as a routine OB.   She was very anxious today about glucola and first bp low and had an episode of passing out. Felt better after water and a snack. Bp resolved.    Problem list reviewed and updated    Pregnancy complicated by:  1. Asperger's disorder    2. Anxiety and Schizoaffective - managed by psychiatry and therapy, Zoloft 100mg and Vraylar 4.5mg, [facility]    3. Insomnia- takes hydroxyzine, recommend to switch to benadryl or unisom    4. GBS bacteruria    5. Obesity- BMI 40, HbA1c 5,  M referral    6. Syncope- referral to cardiology and neurology, hx of normal sleep study as a teenager, workup including EEG and holter monitor. Recommended brain MRI and sleep study post partum, f/u scheduled for 24. Pt was advised to not operate any motor vehicle until she is 6 months episode free. Echo 55-60%    7. Family hx of congenital heart defects- with 1st paternal cousin with CHD, required surgery, normal anatomy echo but limited, followup normal    8. Asthma- allergy and exercise induced, albuterol inhaler prn    Physical Examination:  BP (!) 80/40   Wt 108.4 kg (239 lb)   LMP 2023   BMI 39.77 kg/m²    Gen: AAOx3  Ab: soft, NTTP, gravid uterus  Skin: no edema    Plan:  --discussed anxiety techniques  --Labs glucola ordered  --RTC 2 weeks

## 2024-05-24 ENCOUNTER — ROUTINE PRENATAL (OUTPATIENT)
Dept: OBGYN CLINIC | Age: 24
End: 2024-05-24

## 2024-05-24 VITALS — DIASTOLIC BLOOD PRESSURE: 68 MMHG | BODY MASS INDEX: 39.77 KG/M2 | WEIGHT: 239 LBS | SYSTOLIC BLOOD PRESSURE: 100 MMHG

## 2024-05-24 DIAGNOSIS — Z13.1 SCREENING FOR DIABETES MELLITUS: ICD-10-CM

## 2024-05-24 DIAGNOSIS — F84.5 ASPERGER'S DISORDER: ICD-10-CM

## 2024-05-24 DIAGNOSIS — O09.92 HIGH-RISK PREGNANCY IN SECOND TRIMESTER: Primary | ICD-10-CM

## 2024-05-24 DIAGNOSIS — R82.71 GBS BACTERIURIA: ICD-10-CM

## 2024-05-24 DIAGNOSIS — Z82.79 FAMILY HISTORY OF CONGENITAL HEART DEFECT: ICD-10-CM

## 2024-05-24 DIAGNOSIS — R55 SYNCOPE, UNSPECIFIED SYNCOPE TYPE: ICD-10-CM

## 2024-05-24 DIAGNOSIS — O09.92 HIGH-RISK PREGNANCY IN SECOND TRIMESTER: ICD-10-CM

## 2024-05-24 LAB
ERYTHROCYTE [DISTWIDTH] IN BLOOD BY AUTOMATED COUNT: 12.9 % (ref 11.9–14.6)
GLUCOSE 1 HOUR: 195 MG/DL
HCT VFR BLD AUTO: 34.5 % (ref 35.8–46.3)
HGB BLD-MCNC: 11.3 G/DL (ref 11.7–15.4)
MCH RBC QN AUTO: 30.1 PG (ref 26.1–32.9)
MCHC RBC AUTO-ENTMCNC: 32.8 G/DL (ref 31.4–35)
MCV RBC AUTO: 91.8 FL (ref 82–102)
NRBC # BLD: 0 K/UL (ref 0–0.2)
PLATELET # BLD AUTO: 366 K/UL (ref 150–450)
PMV BLD AUTO: 10.4 FL (ref 9.4–12.3)
RBC # BLD AUTO: 3.76 M/UL (ref 4.05–5.2)
WBC # BLD AUTO: 11 K/UL (ref 4.3–11.1)

## 2024-05-24 PROCEDURE — 0502F SUBSEQUENT PRENATAL CARE: CPT | Performed by: OBSTETRICS & GYNECOLOGY

## 2024-05-24 RX ORDER — HYDROXYZINE HYDROCHLORIDE 25 MG/1
25 TABLET, FILM COATED ORAL 3 TIMES DAILY PRN
COMMUNITY
Start: 2024-05-23

## 2024-05-30 DIAGNOSIS — O24.419 GESTATIONAL DIABETES MELLITUS (GDM) IN THIRD TRIMESTER, GESTATIONAL DIABETES METHOD OF CONTROL UNSPECIFIED: Primary | ICD-10-CM

## 2024-05-30 RX ORDER — GLUCOSAMINE HCL/CHONDROITIN SU 500-400 MG
CAPSULE ORAL
Qty: 100 STRIP | Refills: 5 | Status: SHIPPED | OUTPATIENT
Start: 2024-05-30

## 2024-05-30 RX ORDER — LANCETS 30 GAUGE
1 EACH MISCELLANEOUS DAILY
Qty: 100 EACH | Refills: 5 | Status: SHIPPED | OUTPATIENT
Start: 2024-05-30

## 2024-05-30 RX ORDER — BLOOD-GLUCOSE METER
1 KIT MISCELLANEOUS DAILY
Qty: 1 KIT | Refills: 0 | Status: SHIPPED | OUTPATIENT
Start: 2024-05-30

## 2024-06-03 ENCOUNTER — TELEPHONE (OUTPATIENT)
Dept: OBGYN CLINIC | Age: 24
End: 2024-06-03

## 2024-06-03 PROBLEM — O09.93 HIGH-RISK PREGNANCY IN THIRD TRIMESTER: Status: ACTIVE | Noted: 2024-02-27

## 2024-06-03 PROBLEM — O99.519 ASTHMA DURING PREGNANCY: Status: ACTIVE | Noted: 2022-01-19

## 2024-06-03 PROBLEM — O99.213 OBESITY AFFECTING PREGNANCY IN THIRD TRIMESTER: Status: ACTIVE | Noted: 2024-02-27

## 2024-06-03 RX ORDER — GLUCOSAMINE HCL/CHONDROITIN SU 500-400 MG
CAPSULE ORAL
Qty: 100 STRIP | Refills: 5 | Status: SHIPPED | OUTPATIENT
Start: 2024-06-03

## 2024-06-03 RX ORDER — LANCETS 30 GAUGE
1 EACH MISCELLANEOUS DAILY
Qty: 100 EACH | Refills: 5 | Status: SHIPPED | OUTPATIENT
Start: 2024-06-03

## 2024-06-03 RX ORDER — BLOOD-GLUCOSE METER
1 KIT MISCELLANEOUS DAILY
Qty: 1 KIT | Refills: 0 | Status: SHIPPED | OUTPATIENT
Start: 2024-06-03

## 2024-06-03 NOTE — TELEPHONE ENCOUNTER
Patient called and stated glucose monitoring supplies were called in to wrong pharmacy. Reordered and cancelled previous order.

## 2024-06-06 ENCOUNTER — TELEMEDICINE (OUTPATIENT)
Dept: OBGYN CLINIC | Age: 24
End: 2024-06-06

## 2024-06-06 ENCOUNTER — FOLLOWUP TELEPHONE ENCOUNTER (OUTPATIENT)
Dept: DIABETES SERVICES | Age: 24
End: 2024-06-06

## 2024-06-06 DIAGNOSIS — O24.410 DIET CONTROLLED GESTATIONAL DIABETES MELLITUS (GDM) IN THIRD TRIMESTER: ICD-10-CM

## 2024-06-06 DIAGNOSIS — F81.9 LEARNING DISORDER: Primary | ICD-10-CM

## 2024-06-06 NOTE — PROGRESS NOTES
[facility]  Diabetes Self-Management Education & Support Program  Pre-program Assessment    Reason for Referral: Gestational Diabetes  Referral Source: Rosa Salmon*  Services requested: DSMES - Pregnancy    ASSESSMENT    From my perspective, the participant would benefit from DSMES specifically related to Monitoring and Problem solving.     Will adapt DSMES program to address participant's issues as noted in the Problem Areas in Diabetes (PAID) Scale.    During the program, we will focus on providing DSMES that specifically addresses participant's interest in Healthy Eating, Monitoring, and Problem solving as shown by their reported readiness to change.    The participant would be best served by additional MNT, referral placed, weekly remote monitoring.         Clinical Presentation  Yancy Cornejo is a 24 y.o. White female referred for diabetes self-management education. Participant has GDM.    Family history positive for diabetes. Patient reports not receiving DSMES services in the past. Patient is being referred today for MNT.    Most recent A1c value:   Hemoglobin A1C   Date Value Ref Range Status   02/27/2024 5.0 4.8 - 5.6 % Final       Reference range:  Increased risk for diabetes: 5.7 - 6.4%  Diabetes: >6.4%  Glycemic control for adults with diabetes: <7.0 %    Goal in pregnancy is to maintain an A1C of 6% or less.  RECOMMEND A1C DRAWN EACH TRIMESTER.    Diabetes-related medical history:     Depression and/or Anxiety - Sees therapist every other week.      Learning Assessment  Learning objectives Educator assessment (6/6/2024)   Diabetes Disease Process  The participant can   A) describe diabetes in basic terms;   B) state the type of diabetes they have; &   C) state accepted blood glucose targets.     Healthy Coping  The participant can    A) describe their response to diabetes-related distress  B) describe their specific coping mechanisms;  C) identify supportive people and/or

## 2024-06-10 NOTE — PROGRESS NOTES
OB return  Yancy Cornejo is a 24 y.o. female   with 29w4d IUP with Estimated Date of Delivery: 24 presenting to the clinic as a routine OB.   Still reports some anxiety, appointment today     Unable to start logging until a few days ago. 3 fasting values 2/3 are elevated, 2 hour pp 90s-120s, one 150s but had two bananas.    Problem list reviewed and updated    Pregnancy complicated by:  1. Asperger's disorder     2. Anxiety and Schizoaffective - managed by psychiatry and therapy, Zoloft 100mg, Vraylar 4.5mg, and Atarax, seeing [facility]  I do think some of her syncope is related to anxiety     3. Insomnia- takes hydroxyzine, recommend to switch to benadryl or unisom     4. GBS bacteruria     5. Obesity- BMI 40, HbA1c 5  Plan: serial growth US     6. Syncope- referral to cardiology and neurology, hx of normal sleep study as a teenager, workup including EEG and holter monitor. Recommended brain MRI and sleep study post partum, f/u scheduled for 24. Pt was advised to not operate any motor vehicle until she is 6 months episode free. Echo 55-60%     7. Family hx of congenital heart defects- with 1st paternal cousin with CHD, required surgery, normal anatomy echo but limited, followup normal     8. Asthma- allergy and exercise induced, albuterol inhaler prn    9. GDMA1- failed 1 hour 198  Plan: [facility] referral to blood glucose logging, scheduled     Physical Examination:  /64   Wt 108.4 kg (239 lb)   LMP 2023   BMI 39.77 kg/m²    Gen: AAOx3  Ab: soft, NTTP, gravid uterus  Skin: no edema    Plan:  --recommend to discuss anxiety at her appointment  --discussed pp contraception options  --RTC 2 weeks

## 2024-06-11 ENCOUNTER — ROUTINE PRENATAL (OUTPATIENT)
Dept: OBGYN CLINIC | Age: 24
End: 2024-06-11

## 2024-06-11 VITALS — BODY MASS INDEX: 39.77 KG/M2 | SYSTOLIC BLOOD PRESSURE: 106 MMHG | WEIGHT: 239 LBS | DIASTOLIC BLOOD PRESSURE: 64 MMHG

## 2024-06-11 DIAGNOSIS — O24.410 DIET CONTROLLED GESTATIONAL DIABETES MELLITUS (GDM) IN THIRD TRIMESTER: ICD-10-CM

## 2024-06-11 DIAGNOSIS — R55 SYNCOPE, UNSPECIFIED SYNCOPE TYPE: ICD-10-CM

## 2024-06-11 DIAGNOSIS — O99.213 OBESITY AFFECTING PREGNANCY IN THIRD TRIMESTER, UNSPECIFIED OBESITY TYPE: ICD-10-CM

## 2024-06-11 DIAGNOSIS — O99.519 ASTHMA DURING PREGNANCY: ICD-10-CM

## 2024-06-11 DIAGNOSIS — O09.93 HIGH-RISK PREGNANCY IN THIRD TRIMESTER: Primary | ICD-10-CM

## 2024-06-11 DIAGNOSIS — F84.5 ASPERGER'S DISORDER: ICD-10-CM

## 2024-06-11 DIAGNOSIS — J45.909 ASTHMA DURING PREGNANCY: ICD-10-CM

## 2024-06-11 PROCEDURE — 0502F SUBSEQUENT PRENATAL CARE: CPT | Performed by: OBSTETRICS & GYNECOLOGY

## 2024-06-15 ENCOUNTER — HOSPITAL ENCOUNTER (OUTPATIENT)
Age: 24
Discharge: HOME | End: 2024-06-15
Attending: OBSTETRICS & GYNECOLOGY | Admitting: OBSTETRICS & GYNECOLOGY
Payer: COMMERCIAL

## 2024-06-15 VITALS
HEART RATE: 99 BPM | SYSTOLIC BLOOD PRESSURE: 122 MMHG | BODY MASS INDEX: 39.82 KG/M2 | WEIGHT: 239 LBS | HEIGHT: 65 IN | DIASTOLIC BLOOD PRESSURE: 77 MMHG | RESPIRATION RATE: 16 BRPM | TEMPERATURE: 98.5 F | OXYGEN SATURATION: 97 %

## 2024-06-15 PROBLEM — O26.893 ABDOMINAL PAIN IN PREGNANCY, THIRD TRIMESTER: Status: ACTIVE | Noted: 2024-06-15

## 2024-06-15 PROBLEM — R10.9 ABDOMINAL PAIN IN PREGNANCY, THIRD TRIMESTER: Status: ACTIVE | Noted: 2024-06-15

## 2024-06-15 PROBLEM — O26.893 ABDOMINAL PAIN IN PREGNANCY, THIRD TRIMESTER: Status: RESOLVED | Noted: 2024-06-15 | Resolved: 2024-06-15

## 2024-06-15 PROBLEM — R10.9 ABDOMINAL PAIN IN PREGNANCY, THIRD TRIMESTER: Status: RESOLVED | Noted: 2024-06-15 | Resolved: 2024-06-15

## 2024-06-15 PROCEDURE — 59025 FETAL NON-STRESS TEST: CPT

## 2024-06-15 PROCEDURE — 99283 EMERGENCY DEPT VISIT LOW MDM: CPT

## 2024-06-15 PROCEDURE — 6370000000 HC RX 637 (ALT 250 FOR IP): Performed by: OBSTETRICS & GYNECOLOGY

## 2024-06-15 RX ORDER — ACETAMINOPHEN 500 MG
1000 TABLET ORAL ONCE
Status: COMPLETED | OUTPATIENT
Start: 2024-06-15 | End: 2024-06-15

## 2024-06-15 RX ADMIN — ACETAMINOPHEN 1000 MG: 500 TABLET, FILM COATED ORAL at 01:20

## 2024-06-15 NOTE — H&P
History & Physical    Name: Yancy Cornejo MRN: [id number]  SSN: [id number]    YOB: 2000  Age: 24 y.o.  Sex: female      Subjective:     Reason for Triage visit:  30w1d and abdominal pain    History of Present Illness: Ms. Cornejo is a 24 y.o.  female  with an estimated gestational age of 30w1d with Estimated Date of Delivery: 24. Patient states that she has been noticing abdominal pain, \"tightening\" which happens when she walks/ moves around. Denies contractions. No abdominal pain when at rest. She also has not felt her baby move since last night.  C/o mild headache, rates as 2-3/10.  No visual changes. No vaginal bleeding. Neg LOF.     Admits to feeling anxious about baby.  Had some chest tightness earlier this evening, resolved now.  Admit to anxiety issues and insomnia and states taking meds as prescribed.  No shortness of breath.         Pregnancy has been complicated by:  Gest DM, A1  Anxiety, Depression, Schizoaffective disorder, Autism spectrum disorder  Syncopal episodes- s/p cardiology consult  GBS bacteriuria  Asthma  PTSD    Patient denies fever, right upper quadrant pain  , shortness of breath, vaginal bleeding , vaginal leaking of fluid , visual disturbances, and dysuria, urinary frequency, and urinary urgency.    OB History    Para Term  AB Living   1             SAB IAB Ectopic Molar Multiple Live Births                    # Outcome Date GA Lbr Lei/2nd Weight Sex Delivery Anes PTL Lv   1 Current              Past Medical History:   Diagnosis Date    Asperger's disorder 2008    Asthma 2008    B12 deficiency     Depression 2008    History of panic attacks 2008    PTSD (post-traumatic stress disorder)     Schizoaffective disorder (HCC)      Past Surgical History:   Procedure Laterality Date    TONSILLECTOMY AND ADENOIDECTOMY Bilateral 2008    WISDOM TOOTH EXTRACTION  2018     Social History     Occupational History    Not on file   Tobacco Use    Smoking  status: Never    Smokeless tobacco: Never   Vaping Use    Vaping Use: Never used   Substance and Sexual Activity    Alcohol use: Never    Drug use: Never    Sexual activity: Yes     Partners: Male      Family History   Problem Relation Age of Onset    Breast Cancer Paternal Grandmother         60s    Heart Surgery Maternal Grandfather     No Known Problems Father     No Known Problems Mother     Depression Brother     Anxiety Disorder Brother     No Known Problems Sister     No Known Problems Sister     Heart Defect Paternal Cousin         open heart surgery as an infant    Ovarian Cancer Neg Hx     Colon Cancer Neg Hx        Allergies   Allergen Reactions    Penicillins Hives    Gluten Other (See Comments)     Hx of stomach issues; been better since removing gluten from diet    Soy Rash     Prior to Admission medications    Medication Sig Start Date End Date Taking? Authorizing Provider   blood glucose monitor strips Test 4 times a day & as needed for symptoms of irregular blood glucose. Dispense sufficient amount for indicated testing frequency plus additional to accommodate PRN testing needs. 6/3/24   Rosa Salmon MD   Lancets MISC 1 each by Does not apply route daily Test blood sugars 4x daily. 6/3/24   Rosa Salmon MD   glucose monitoring kit 1 kit by Does not apply route daily Check blood sugars 4x daily. 6/3/24   Rosa Salmon MD   hydrOXYzine HCl (ATARAX) 25 MG tablet Take 1 tablet by mouth 3 times daily as needed 5/23/24   ProviderKathie MD   VRAYLAR 4.5 MG CAPS capsule Take 1 capsule by mouth daily 4/23/24   Kathie Guzman MD   Prenatal Vit-Fe Fumarate-FA (PRENATAL PO) Take by mouth    Kathie Guzman MD   diphenhydrAMINE (BENADRYL) 25 MG capsule Take 1 capsule by mouth every 6 hours as needed for Itching    Kathie Guzman MD   famotidine (PEPCID) 20 MG tablet Take 1 tablet by mouth 2 times daily 4/2/24   Ceci Benítez MD   ondansetron

## 2024-06-18 ENCOUNTER — ROUTINE PRENATAL (OUTPATIENT)
Dept: OBGYN CLINIC | Age: 24
End: 2024-06-18
Payer: COMMERCIAL

## 2024-06-18 VITALS — SYSTOLIC BLOOD PRESSURE: 130 MMHG | HEART RATE: 111 BPM | DIASTOLIC BLOOD PRESSURE: 78 MMHG

## 2024-06-18 DIAGNOSIS — O99.340 ANTEPARTUM MENTAL DISORDERS OF MOTHER: ICD-10-CM

## 2024-06-18 DIAGNOSIS — F43.10 PTSD (POST-TRAUMATIC STRESS DISORDER): ICD-10-CM

## 2024-06-18 DIAGNOSIS — O21.9 NAUSEA AND VOMITING DURING PREGNANCY: ICD-10-CM

## 2024-06-18 DIAGNOSIS — Z3A.30 30 WEEKS GESTATION OF PREGNANCY: ICD-10-CM

## 2024-06-18 DIAGNOSIS — O99.213 OBESITY AFFECTING PREGNANCY IN THIRD TRIMESTER, UNSPECIFIED OBESITY TYPE: ICD-10-CM

## 2024-06-18 DIAGNOSIS — E66.9 OBESITY, CLASS II, BMI 35-39.9: ICD-10-CM

## 2024-06-18 DIAGNOSIS — O09.93 HIGH-RISK PREGNANCY IN THIRD TRIMESTER: Primary | ICD-10-CM

## 2024-06-18 DIAGNOSIS — F84.5 ASPERGER'S DISORDER: ICD-10-CM

## 2024-06-18 DIAGNOSIS — J45.909 ASTHMA DURING PREGNANCY: ICD-10-CM

## 2024-06-18 DIAGNOSIS — O99.519 ASTHMA DURING PREGNANCY: ICD-10-CM

## 2024-06-18 DIAGNOSIS — R82.71 GBS BACTERIURIA: ICD-10-CM

## 2024-06-18 DIAGNOSIS — R55 SYNCOPE, UNSPECIFIED SYNCOPE TYPE: ICD-10-CM

## 2024-06-18 DIAGNOSIS — O24.410 DIET CONTROLLED GESTATIONAL DIABETES MELLITUS (GDM) IN THIRD TRIMESTER: ICD-10-CM

## 2024-06-18 DIAGNOSIS — Z82.79 FAMILY HISTORY OF CONGENITAL HEART DEFECT: ICD-10-CM

## 2024-06-18 PROCEDURE — 76820 UMBILICAL ARTERY ECHO: CPT | Performed by: OBSTETRICS & GYNECOLOGY

## 2024-06-18 PROCEDURE — G8427 DOCREV CUR MEDS BY ELIG CLIN: HCPCS | Performed by: OBSTETRICS & GYNECOLOGY

## 2024-06-18 PROCEDURE — 1036F TOBACCO NON-USER: CPT | Performed by: OBSTETRICS & GYNECOLOGY

## 2024-06-18 PROCEDURE — 99214 OFFICE O/P EST MOD 30 MIN: CPT | Performed by: OBSTETRICS & GYNECOLOGY

## 2024-06-18 PROCEDURE — 76816 OB US FOLLOW-UP PER FETUS: CPT | Performed by: OBSTETRICS & GYNECOLOGY

## 2024-06-18 PROCEDURE — 76819 FETAL BIOPHYS PROFIL W/O NST: CPT | Performed by: OBSTETRICS & GYNECOLOGY

## 2024-06-18 PROCEDURE — G8419 CALC BMI OUT NRM PARAM NOF/U: HCPCS | Performed by: OBSTETRICS & GYNECOLOGY

## 2024-06-18 RX ORDER — CARIPRAZINE 4.5 MG/1
4.5 CAPSULE, GELATIN COATED ORAL DAILY
Qty: 30 CAPSULE | Refills: 0 | Status: SHIPPED | OUTPATIENT
Start: 2024-06-18

## 2024-06-18 ASSESSMENT — PATIENT HEALTH QUESTIONNAIRE - PHQ9
2. FEELING DOWN, DEPRESSED OR HOPELESS: SEVERAL DAYS
SUM OF ALL RESPONSES TO PHQ9 QUESTIONS 1 & 2: 2
SUM OF ALL RESPONSES TO PHQ QUESTIONS 1-9: 2
SUM OF ALL RESPONSES TO PHQ QUESTIONS 1-9: 2
1. LITTLE INTEREST OR PLEASURE IN DOING THINGS: SEVERAL DAYS
SUM OF ALL RESPONSES TO PHQ QUESTIONS 1-9: 2
SUM OF ALL RESPONSES TO PHQ QUESTIONS 1-9: 2

## 2024-06-18 NOTE — ASSESSMENT & PLAN NOTE
Patient reports that her Vraylar prescription did not go through following last mental health visit. Rx 1 month refill for Vraylar sent to pharmacy to bridge patient until prescriptions are sorted out. Discussed that this medication is best avoided in breast feeding as it may be transmitted in high levels in breast milk.

## 2024-06-18 NOTE — PROGRESS NOTES
[facility] MATERNAL FETAL MEDICINE    [address] [place] [address]  P- [phone]  [phone]       M Follow-up Visit  Yancy Cornejo (2000) is a 24 y.o.  at 30w4d with 2024, by Last Menstrual Period.   Presents for evaluation of the following chief complaint(s):   Chief Complaint   Patient presents with    Ultrasound    High Risk Pregnancy     Anxiety/Depression, Asperger's disorder, Asthma, BMI > 40, FH CHD, PTSD, Schizoaffective disorder     Patient is not currently working due to driving restrictions.   Patient is scheduled to see Primary OB ([facility]) on 2024.  Support person, mother and grandmother, present today.   Expecting baby girl, Unique.        Interval history since prior appt reviewed and chart updated as indicated.    No HAs, edema.  Denies preeclamptic symptoms.  Reports good fetal movement.  No bleeding, LOF, cramping, ctxs, or vaginal pressure.        Mood evaluated today based on discussion with pt and PHQ screen.       2024    10:11 AM   PHQ-9    Little interest or pleasure in doing things 1   Feeling down, depressed, or hopeless 1   PHQ-2 Score 2   PHQ-9 Total Score 2      Mood Reassuring today    Addressed normal pregnancy complaints, reassured and offered suggestions for care  Reviewed gestational age precautions and activity goals/limitations  Nutritional counseling as well as specific goals based on current maternal and fetal status  Options for GERD, constipation, other common complaints reviewed.   Reviewed gestational age appropriate preventive care regarding communicable disease transmission and vaccines as appropriate (including flu, TDaP >28wk, RSV 32-36wk, and COVID.)    Exam:     Vitals:    24 1104   BP: 130/78   Pulse: (!) 111      Physical Exam  Applicable labs reviewed.   Please see formal ultrasound report under imaging tab.      ASSESSMENT/PLAN:  Patient Active Problem List    Diagnosis Date Noted    Learning disorder 2024

## 2024-06-20 ENCOUNTER — FOLLOWUP TELEPHONE ENCOUNTER (OUTPATIENT)
Dept: CASE MANAGEMENT | Age: 24
End: 2024-06-20

## 2024-06-20 ENCOUNTER — TELEPHONE (OUTPATIENT)
Dept: OBGYN CLINIC | Age: 24
End: 2024-06-20

## 2024-06-20 DIAGNOSIS — O99.340 ANTEPARTUM MENTAL DISORDERS OF MOTHER: Primary | ICD-10-CM

## 2024-06-20 NOTE — TELEPHONE ENCOUNTER
Phone call to patient at [phone].  No answer; message left requesting call back.     NAZARIO Hilario-PATY, PMH-C  [facility]   [phone]

## 2024-06-20 NOTE — TELEPHONE ENCOUNTER
Notified pt that PA for Vraylar 4.5 mg approved today. Instructed pt to contact pharmacy to run script. Viewed instructions again with pt. She voiced understanding.

## 2024-06-21 ENCOUNTER — FOLLOWUP TELEPHONE ENCOUNTER (OUTPATIENT)
Dept: CASE MANAGEMENT | Age: 24
End: 2024-06-21

## 2024-06-21 NOTE — TELEPHONE ENCOUNTER
Phone call to patient at [phone].     After speaking with patient, she denied needing any assistance with her Vrylar prescription as a prior authorization was provided by her insurance company.      Additionally, the prescription has been filled, and she will be picking it up soon.    No additional needs identified.    FREEMAN Hilario, PM-C  [facility]   [phone]

## 2024-06-25 ENCOUNTER — ROUTINE PRENATAL (OUTPATIENT)
Dept: OBGYN CLINIC | Age: 24
End: 2024-06-25

## 2024-06-25 VITALS — BODY MASS INDEX: 39.77 KG/M2 | WEIGHT: 239 LBS | DIASTOLIC BLOOD PRESSURE: 78 MMHG | SYSTOLIC BLOOD PRESSURE: 126 MMHG

## 2024-06-25 DIAGNOSIS — Z3A.31 31 WEEKS GESTATION OF PREGNANCY: ICD-10-CM

## 2024-06-25 DIAGNOSIS — Z82.79 FAMILY HISTORY OF CONGENITAL HEART DEFECT: ICD-10-CM

## 2024-06-25 DIAGNOSIS — O09.93 HIGH-RISK PREGNANCY IN THIRD TRIMESTER: Primary | ICD-10-CM

## 2024-06-25 DIAGNOSIS — O99.519 ASTHMA DURING PREGNANCY: ICD-10-CM

## 2024-06-25 DIAGNOSIS — O24.410 DIET CONTROLLED GESTATIONAL DIABETES MELLITUS (GDM) IN THIRD TRIMESTER: ICD-10-CM

## 2024-06-25 DIAGNOSIS — J45.909 ASTHMA DURING PREGNANCY: ICD-10-CM

## 2024-06-25 DIAGNOSIS — F84.5 ASPERGER'S DISORDER: ICD-10-CM

## 2024-06-25 DIAGNOSIS — R55 SYNCOPE, UNSPECIFIED SYNCOPE TYPE: ICD-10-CM

## 2024-06-25 DIAGNOSIS — R82.71 GBS BACTERIURIA: ICD-10-CM

## 2024-06-25 DIAGNOSIS — O99.213 OBESITY AFFECTING PREGNANCY IN THIRD TRIMESTER, UNSPECIFIED OBESITY TYPE: ICD-10-CM

## 2024-06-25 DIAGNOSIS — O99.340 ANTEPARTUM MENTAL DISORDERS OF MOTHER: ICD-10-CM

## 2024-06-25 DIAGNOSIS — F81.9 LEARNING DISORDER: ICD-10-CM

## 2024-06-25 DIAGNOSIS — O21.9 NAUSEA AND VOMITING DURING PREGNANCY: ICD-10-CM

## 2024-06-25 DIAGNOSIS — F43.10 PTSD (POST-TRAUMATIC STRESS DISORDER): ICD-10-CM

## 2024-06-25 PROCEDURE — 0502F SUBSEQUENT PRENATAL CARE: CPT | Performed by: NURSE PRACTITIONER

## 2024-06-25 RX ORDER — SERTRALINE HYDROCHLORIDE 100 MG/1
TABLET, FILM COATED ORAL
COMMUNITY
Start: 2024-06-20
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
## 2024-06-25 NOTE — PATIENT INSTRUCTIONS
PTL/labor precautions, FMC, and pregnancy warning signs reviewed. Pt advised to call the office at [phone] or go straight to Labor and Delivery at [facility] with any of the following concerns vaginal bleeding, leaking of fluid, essence regularly Q 5-7 minutes for over an hour or not feeling the baby move.     Kick counts and pre-term labor precautions reviewed

## 2024-06-28 ENCOUNTER — HOSPITAL ENCOUNTER (OUTPATIENT)
Age: 24
Discharge: HOME | End: 2024-06-29
Attending: OBSTETRICS & GYNECOLOGY | Admitting: OBSTETRICS & GYNECOLOGY
Payer: COMMERCIAL

## 2024-06-28 VITALS
HEART RATE: 97 BPM | RESPIRATION RATE: 16 BRPM | OXYGEN SATURATION: 95 % | TEMPERATURE: 98.6 F | DIASTOLIC BLOOD PRESSURE: 66 MMHG | SYSTOLIC BLOOD PRESSURE: 134 MMHG

## 2024-06-28 PROCEDURE — 96372 THER/PROPH/DIAG INJ SC/IM: CPT

## 2024-06-28 PROCEDURE — 99283 EMERGENCY DEPT VISIT LOW MDM: CPT

## 2024-06-29 PROBLEM — R10.9 ABDOMINAL PAIN IN PREGNANCY, THIRD TRIMESTER: Status: ACTIVE | Noted: 2024-06-29

## 2024-06-29 PROBLEM — O26.893 ABDOMINAL PAIN IN PREGNANCY, THIRD TRIMESTER: Status: RESOLVED | Noted: 2024-06-29 | Resolved: 2024-06-29

## 2024-06-29 PROBLEM — O26.893 ABDOMINAL PAIN IN PREGNANCY, THIRD TRIMESTER: Status: ACTIVE | Noted: 2024-06-29

## 2024-06-29 PROBLEM — R10.9 ABDOMINAL PAIN IN PREGNANCY, THIRD TRIMESTER: Status: RESOLVED | Noted: 2024-06-29 | Resolved: 2024-06-29

## 2024-06-29 PROCEDURE — 6360000002 HC RX W HCPCS: Performed by: OBSTETRICS & GYNECOLOGY

## 2024-06-29 PROCEDURE — 96372 THER/PROPH/DIAG INJ SC/IM: CPT

## 2024-06-29 PROCEDURE — 99283 EMERGENCY DEPT VISIT LOW MDM: CPT

## 2024-06-29 RX ORDER — TERBUTALINE SULFATE 1 MG/ML
0.25 INJECTION, SOLUTION SUBCUTANEOUS ONCE
Status: COMPLETED | OUTPATIENT
Start: 2024-06-29 | End: 2024-06-29

## 2024-06-29 RX ADMIN — TERBUTALINE SULFATE 0.25 MG: 1 INJECTION, SOLUTION SUBCUTANEOUS at 00:29

## 2024-06-29 NOTE — DISCHARGE INSTRUCTIONS
PLEASE FOLLOW-UP WITH PRIMARY OB AT NEXT SCHEDULED VISIT. RETURN TO A AMILCAR IF EXPERIENCING CONTRACTIONS THAT BECOME STRONGER AND CLOSER TOGETHER, VAGINAL BLEEDING, LEAKING OF FLUIDS, OR DECREASED FETAL MOVEMENT.         Belly Pain in Pregnancy: Care Instructions  Overview     When you're pregnant, any belly pain can be a worry. You may not want to call your doctor or midwife about every pain you have. But you don't want to miss something that is dangerous for you or your baby.  Even if it feels familiar, belly pain can mean something new when you're pregnant.  It's important to know when to call your doctor or midwife. It will also help to know how to care for yourself at home when your pain is not caused by anything harmful.  When belly pain is more severe or constant, see a doctor or midwife right away.  If you're sure your belly pain is a sign of labor, call your doctor or midwife.  When belly pain is mild and brief and comes and goes, it's usually a normal part of pregnancy. It might be related to changes in the growing uterus. Or it could be the stretching of ligaments called round ligaments. These ligaments help support the uterus. Round ligament pain can be on either side of your belly. It can also be felt in your hips or groin.  Mild belly discomfort can also be related to digestion. Things like constipation, bloating, and heartburn are common during pregnancy.  Follow-up care is a key part of your treatment and safety. Be sure to make and go to all appointments, and call your doctor if you are having problems. It's also a good idea to know your test results and keep a list of the medicines you take.  How can you tell if belly pain is a sign of labor?  When belly pain is caused by labor, it can feel like mild or menstrual-like cramps in your lower belly. These cramps are probably contractions. They can happen in your second or third trimester.  You may also have:  A steady, dull ache in your lower back,  increase the amount of fluids you drink.  Tell your doctor right away if you notice any symptoms of an infection, such as:  Burning when you urinate.  A frequent need to urinate without being able to pass much urine.  A foul-smelling discharge from your vagina.  Vaginal itching.  Unexplained fever.  Unusual pain or soreness in your uterus or lower belly.  Avoid foods that may be harmful.  Don't eat raw meat, deli meat, raw seafood, or raw eggs.  Avoid soft cheese and unpasteurized dairy, like Brie and blue cheese.  Avoid fish that are high in mercury. These include shark, swordfish, josette mackerel, marlin, orange roughy, and bigeye tuna, as well as tilefish from the [facility].  If you smoke or vape, quit or cut back as much as you can. Talk to your doctor if you need help quitting.  If you use alcohol, marijuana, or other drugs, quit or cut back as much as you can. It's safest not to use them at all. Talk to your doctor if you need help quitting.  Follow your doctor's directions about activity. Your doctor will let you know how much exercise you can do.  Ask your doctor if you can have sex. If you are at risk for early labor, your doctor may ask you to not have sex.  Take care to avoid falling. Changes in your body during pregnancy, such as a growing belly, can make you more likely to fall. Sports such as bicycling, skiing, or in-line skating can increase your risk.  Avoid risky activities like horseback or motorcycle riding, water-skiing, scuba diving, and exercising at a high altitude (above 6,000 feet). If you live in a place with a high altitude, talk to your doctor about how you can exercise safely.  Avoid things that can make your body too hot and may be harmful to your pregnancy, such as a hot tub or sauna. Or talk with your doctor before doing anything that raises your body temperature. Your doctor can tell you if it's safe.  Do not take any over-the-counter or herbal medicines or supplements without

## 2024-06-29 NOTE — H&P
History & Physical    Name: Yancy Cornejo MRN: [id number]  SSN: [id number]    YOB: 2000  Age: 24 y.o.  Sex: female      Subjective:     Reason for Triage visit:  32w1d and abdominal pain    History of Present Illness: Ms. Cornejo is a 24 y.o.  female  with an estimated gestational age of 32w1d with Estimated Date of Delivery: 24. Patient states that she has been having pain in her abdomen and back all day, unsure if this could be contractions. States she is feeling anxious as she was recently given precautions for  labor at an office appt. She wants to make sure everything is okay.     C/o lower back pain. C/o pelvic pressure.  Good FM.  No vaginal bleeding or leakage of fluid.        Pregnancy has been complicated by:  Gest DM, A1  Anxiety, Depression, Schizoaffective disorder, Autism spectrum disorder  Syncopal episodes- s/p cardiology consult  GBS bacteriuria  Asthma  PTSD    Patient denies chest pain, fever, headache , shortness of breath, vaginal bleeding , vaginal leaking of fluid , visual disturbances, and dysuria, urinary frequency, and urinary urgency.    OB History    Para Term  AB Living   1             SAB IAB Ectopic Molar Multiple Live Births                    # Outcome Date GA Lbr Lei/2nd Weight Sex Delivery Anes PTL Lv   1 Current              Past Medical History:   Diagnosis Date    Asperger's disorder 2008    Asthma 2008    B12 deficiency     Depression 2008    History of panic attacks 2008    PTSD (post-traumatic stress disorder)     Schizoaffective disorder (HCC)      Past Surgical History:   Procedure Laterality Date    TONSILLECTOMY AND ADENOIDECTOMY Bilateral 2008    WISDOM TOOTH EXTRACTION  2018     Social History     Occupational History    Not on file   Tobacco Use    Smoking status: Never    Smokeless tobacco: Never   Vaping Use    Vaping Use: Never used   Substance and Sexual Activity    Alcohol use: Never    Drug use: Never    Sexual

## 2024-07-08 PROBLEM — F41.9 ANXIETY: Status: ACTIVE | Noted: 2024-07-08
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
## 2024-07-09 ENCOUNTER — ROUTINE PRENATAL (OUTPATIENT)
Dept: OBGYN CLINIC | Age: 24
End: 2024-07-09

## 2024-07-09 ENCOUNTER — TELEPHONE (OUTPATIENT)
Dept: OBGYN CLINIC | Age: 24
End: 2024-07-09

## 2024-07-09 VITALS — BODY MASS INDEX: 39.37 KG/M2 | WEIGHT: 236.6 LBS | SYSTOLIC BLOOD PRESSURE: 100 MMHG | DIASTOLIC BLOOD PRESSURE: 58 MMHG

## 2024-07-09 DIAGNOSIS — O09.93 HIGH-RISK PREGNANCY IN THIRD TRIMESTER: Primary | ICD-10-CM

## 2024-07-09 DIAGNOSIS — F84.5 ASPERGER'S DISORDER: ICD-10-CM

## 2024-07-09 DIAGNOSIS — R55 SYNCOPE, UNSPECIFIED SYNCOPE TYPE: ICD-10-CM

## 2024-07-09 DIAGNOSIS — O24.410 DIET CONTROLLED GESTATIONAL DIABETES MELLITUS (GDM) IN THIRD TRIMESTER: ICD-10-CM

## 2024-07-09 DIAGNOSIS — O99.340 ANTEPARTUM MENTAL DISORDERS OF MOTHER: ICD-10-CM

## 2024-07-09 DIAGNOSIS — F41.9 ANXIETY: ICD-10-CM

## 2024-07-09 PROCEDURE — 0502F SUBSEQUENT PRENATAL CARE: CPT | Performed by: OBSTETRICS & GYNECOLOGY

## 2024-07-09 NOTE — TELEPHONE ENCOUNTER
Pt reports that she is having a difficult time sending her BG log by email. Sent an email for her to respond to and send her readings.    Pt reviewed her readings with me today:    FBG's , most are in the 90's-100's. Has not been QID testing daily. PP's , most at target.     Pt had some questions regarding diet since she was still feeling hungry. Discussed with her regarding eating fruit in the morning and before bedtime since she said she was told to avoid eating any during these times. Reviewed portions with her and how to incorporate fruit into her meal plan.    Pt also had questions regarding teas in pregnancy. Specifically she asked about SleepyTime tea. Discussed with Dr. Luo. Based on the ingredients, advised against this brand d/t Valerian root. Recommend Chamomile/Lavender tea. Pt notified by phone.

## 2024-07-12 DIAGNOSIS — O24.415 GESTATIONAL DIABETES MELLITUS (GDM) IN THIRD TRIMESTER CONTROLLED ON ORAL HYPOGLYCEMIC DRUG: Primary | ICD-10-CM

## 2024-07-12 RX ORDER — METFORMIN HYDROCHLORIDE 500 MG/1
TABLET, EXTENDED RELEASE ORAL
Qty: 60 TABLET | Refills: 5 | Status: SHIPPED | OUTPATIENT
Start: 2024-07-12

## 2024-07-15 ENCOUNTER — OFFICE VISIT (OUTPATIENT)
Dept: OBGYN CLINIC | Age: 24
End: 2024-07-15

## 2024-07-15 ENCOUNTER — ROUTINE PRENATAL (OUTPATIENT)
Dept: OBGYN CLINIC | Age: 24
End: 2024-07-15

## 2024-07-15 VITALS — HEART RATE: 101 BPM | SYSTOLIC BLOOD PRESSURE: 130 MMHG | DIASTOLIC BLOOD PRESSURE: 78 MMHG | OXYGEN SATURATION: 99 %

## 2024-07-15 DIAGNOSIS — Z3A.34 34 WEEKS GESTATION OF PREGNANCY: ICD-10-CM

## 2024-07-15 DIAGNOSIS — O99.519 ASTHMA DURING PREGNANCY: ICD-10-CM

## 2024-07-15 DIAGNOSIS — R55 SYNCOPE, UNSPECIFIED SYNCOPE TYPE: ICD-10-CM

## 2024-07-15 DIAGNOSIS — O09.93 HIGH-RISK PREGNANCY IN THIRD TRIMESTER: ICD-10-CM

## 2024-07-15 DIAGNOSIS — O24.415 GESTATIONAL DIABETES MELLITUS (GDM) IN THIRD TRIMESTER CONTROLLED ON ORAL HYPOGLYCEMIC DRUG: Primary | ICD-10-CM

## 2024-07-15 DIAGNOSIS — O99.213 OBESITY AFFECTING PREGNANCY IN THIRD TRIMESTER, UNSPECIFIED OBESITY TYPE: ICD-10-CM

## 2024-07-15 DIAGNOSIS — O99.340 ANTEPARTUM MENTAL DISORDERS OF MOTHER: ICD-10-CM

## 2024-07-15 DIAGNOSIS — Z82.79 FAMILY HISTORY OF CONGENITAL HEART DEFECT: ICD-10-CM

## 2024-07-15 DIAGNOSIS — O21.9 NAUSEA AND VOMITING DURING PREGNANCY: ICD-10-CM

## 2024-07-15 DIAGNOSIS — J45.909 ASTHMA DURING PREGNANCY: ICD-10-CM

## 2024-07-15 RX ORDER — ALBUTEROL SULFATE 90 UG/1
2 AEROSOL, METERED RESPIRATORY (INHALATION) EVERY 4 HOURS PRN
Qty: 18 G | Refills: 0 | Status: SHIPPED | OUTPATIENT
Start: 2024-07-15

## 2024-07-15 NOTE — PROGRESS NOTES
disorder and autism spectrum disorder.    24 [facility]: Managed by  at [facility], goes once per month. Attends therapy every 2 weeks. Reports stable mood. Currently taking Zoloft 100 mg daily and Vraylar 4.5 mg daily. Vraylar is a newer antipsychotic with no reliable data on safety in pregnancy. In general antipsychotics are acceptable for use in pregnancy. Ultrasound today was reassuring. It should be discussed with the patient at her next visit that it is recommended that this medication be used with caution during breastfeeding.    We also discussed the possibility of  withdrawal. This phenomenon is difficult to study, and much of our insight is based on observational data. Polypharmacy (especially with pain medication) is recognized as a possible risk factor for  withdrawal. We can consider a referral to neonatology for further discussion later in the 3rd trimester if the patient desires. In any event the  should be observed for withdrawal symptoms given the current maternal medication use, even if the risk of  withdrawal is unknown or low.    24 [facility]: Reports stable mood. Currently taking Zoloft 100 mg daily and Vraylar 4.5 mg daily. Pt states she may have to change Psychiatrist due to insurance coverage. Message sent to Marilin Palmer to aid in finding new Psychiatrist.   24 [facility]: Reports stable mood. Patient currently out of prescription Vraylar due to insurance coverage and change in Psychiatrist. Message sent to Marilin Palmer to reach out to patient today for new Psychiatrist.  Prescription for one month supply Vrayler sent to pharmacy.  24 [facility] sent via [facility] -->APPROVED, pt notified by phone       Assessment & Plan Note:     Reports stable mood, still feels she's valdez, better since Zoloft increased, no SI/HI.      Nausea and vomiting during pregnancy 2024     Overview Note:     24 [facility]: Reports nausea and

## 2024-07-16 NOTE — PROGRESS NOTES
[facility]  Diabetes Self-Management Education & Support Program  Encounter note    SUMMARY    Diabetes self-care management training was completed related to Healthy Eating, Monitoring, and Taking medication. The participant will not return but will have remote monitoring. DSMES PRN.    The participant did identify SMART Goal(s): - Will  medication and start taking medication daily as directed this week , and will practice knowledge and skills related to healthy eating and monitoring and medications to improve their diabetes self-management.    EVALUATION:  Pt reports limited activity d/t overall pains from fetal position and gestational age. Has been mindful about eating throughout the day. Readings were not available for visit but pt reported some mild elevations. She also didn't  her Metformin that was sent in on 7/12. Instructed pt to  her medication and start it at bedtime.    RECOMMENDATIONS:  Continued remote monitoring. Postpartum care is needed with a PCP for diabetic prevention.     Next provider visit is scheduled for 7/23/24.         DATE DSMES TOPIC EVALUATION     7/16/2024 WHAT IS DIABETES?   Role of the normal pancreas in energy balance and blood glucose control   The defect seen in diabetes   Signs & symptoms of diabetes   Diagnosis of diabetes   Types of diabetes   Blood glucose targets     The participant knows  Their type of diabetes Yes  The basic physiologic defect Yes  Blood glucose targets Yes       DATE DSMES TOPIC EVALUATION     7/16/2024 HOW CAN BLOOD GLUCOSE MONITORING HELP ME?   Value of blood glucose monitoring   Realistic expectations   Blood glucose monitoring targets   Target adjustments   Setting a1c & blood glucose targets with provider   Meter selection    Technique for obtaining blood droplet   Blood glucose testing sites   Determining best times to test   Pregnancy recommendations   Data sharing with provider        The participant   Can

## 2024-07-18 ENCOUNTER — HOSPITAL ENCOUNTER (OUTPATIENT)
Age: 24
Discharge: HOME | End: 2024-07-18
Attending: OBSTETRICS & GYNECOLOGY | Admitting: OBSTETRICS & GYNECOLOGY
Payer: COMMERCIAL

## 2024-07-18 VITALS
RESPIRATION RATE: 18 BRPM | DIASTOLIC BLOOD PRESSURE: 74 MMHG | TEMPERATURE: 97.5 F | SYSTOLIC BLOOD PRESSURE: 117 MMHG | OXYGEN SATURATION: 98 % | HEART RATE: 87 BPM

## 2024-07-18 PROBLEM — O47.9 FALSE LABOR: Status: RESOLVED | Noted: 2024-07-18 | Resolved: 2024-07-18

## 2024-07-18 PROBLEM — O47.9 FALSE LABOR: Status: ACTIVE | Noted: 2024-07-18

## 2024-07-18 PROCEDURE — 99284 EMERGENCY DEPT VISIT MOD MDM: CPT

## 2024-07-18 PROCEDURE — 6370000000 HC RX 637 (ALT 250 FOR IP): Performed by: OBSTETRICS & GYNECOLOGY

## 2024-07-18 PROCEDURE — 59025 FETAL NON-STRESS TEST: CPT

## 2024-07-18 RX ORDER — ACETAMINOPHEN 325 MG/1
650 TABLET ORAL ONCE
Status: COMPLETED | OUTPATIENT
Start: 2024-07-18 | End: 2024-07-18

## 2024-07-18 RX ADMIN — ACETAMINOPHEN 650 MG: 325 TABLET, FILM COATED ORAL at 03:54

## 2024-07-18 NOTE — PROGRESS NOTES
OB ED     Pt to [facility] with complaints of contractions starting around 2100. EFM and TOCO applied. Abd palpated soft. Positive fetal movement noted, denies LOF or VB. [facility] notified of patient's arrival.

## 2024-07-18 NOTE — H&P
Obstetrics History & Physical/OB ED note    Name: Yancy Cornejo MRN: [id number]     YOB: 2000  Age: 24 y.o.  Sex: female      Reason for Presentation:  contractions/possible labor    HPI: Yancy Cornejo is a 24 y.o.  female with Estimated Date of Delivery: 24 at 34w6d gestation. Her obstetrical history is significant for gestational diabetes on oral hypoglycemic agent, metformin.  Prenatal records reviewed.     Complaining of intermittent lower abdominal pains that woke her from sleep. She is unsure if they are contractions or not.    She reports good fetal movement. She denies vaginal bleeding. She denies leakage of fluid.      Past History:  OB History    Para Term  AB Living   1             SAB IAB Ectopic Molar Multiple Live Births                    # Outcome Date GA Lbr Lei/2nd Weight Sex Delivery Anes PTL Lv   1 Current              Past Medical History:   Diagnosis Date    Asperger's disorder 2008    Asthma 2008    B12 deficiency     Depression 2008    History of panic attacks     PTSD (post-traumatic stress disorder)     Schizoaffective disorder (HCC)      Past Surgical History:   Procedure Laterality Date    TONSILLECTOMY AND ADENOIDECTOMY Bilateral 2008    WISDOM TOOTH EXTRACTION  2018     Social History     Tobacco Use    Smoking status: Never    Smokeless tobacco: Never   Substance Use Topics    Alcohol use: Never     Prior to Admission medications    Medication Sig Start Date End Date Taking? Authorizing Provider   albuterol sulfate HFA (VENTOLIN HFA) 108 (90 Base) MCG/ACT inhaler Inhale 2 puffs into the lungs every 4 hours as needed for Wheezing 7/15/24   Isael Esparza MD   metFORMIN (GLUCOPHAGE-XR) 500 MG extended release tablet 1 tablet with higher carb meals and 1 tablet at bedtime  Patient not taking: Reported on 7/15/2024 7/12/24   Isael Esparza MD   sertraline (ZOLOFT) 100 MG tablet TAKE 1 AND 1/2 TABLETS DAILY BY MOUTH 24   Provider,

## 2024-07-18 NOTE — PROGRESS NOTES
Discharge instruction given. Information/teaching printout given to patient. States understanding. All questions answered. Informed pt to return to hospital for decreased fetal movement, if she suspects her water breaks, if vaginal bleeding occurs that is more than spotting, or she starts to experience painful regular contractions 3-5 minutes apart. Pt verbalizes understanding. Discussed importance of follow up with primary MD. Ambulate out to personal auto with mom at side. Pt stable at discharge.

## 2024-07-23 ENCOUNTER — PROCEDURE VISIT (OUTPATIENT)
Dept: OBGYN CLINIC | Age: 24
End: 2024-07-23
Payer: COMMERCIAL

## 2024-07-23 ENCOUNTER — ROUTINE PRENATAL (OUTPATIENT)
Dept: OBGYN CLINIC | Age: 24
End: 2024-07-23

## 2024-07-23 VITALS — WEIGHT: 236 LBS | DIASTOLIC BLOOD PRESSURE: 74 MMHG | SYSTOLIC BLOOD PRESSURE: 118 MMHG | BODY MASS INDEX: 39.27 KG/M2

## 2024-07-23 DIAGNOSIS — R55 SYNCOPE, UNSPECIFIED SYNCOPE TYPE: ICD-10-CM

## 2024-07-23 DIAGNOSIS — Z3A.35 35 WEEKS GESTATION OF PREGNANCY: ICD-10-CM

## 2024-07-23 DIAGNOSIS — R82.71 GBS BACTERIURIA: ICD-10-CM

## 2024-07-23 DIAGNOSIS — Z82.79 FAMILY HISTORY OF CONGENITAL HEART DEFECT: ICD-10-CM

## 2024-07-23 DIAGNOSIS — F43.10 PTSD (POST-TRAUMATIC STRESS DISORDER): ICD-10-CM

## 2024-07-23 DIAGNOSIS — O09.93 HIGH-RISK PREGNANCY IN THIRD TRIMESTER: ICD-10-CM

## 2024-07-23 DIAGNOSIS — F84.5 ASPERGER'S DISORDER: ICD-10-CM

## 2024-07-23 DIAGNOSIS — O99.213 OBESITY AFFECTING PREGNANCY IN THIRD TRIMESTER, UNSPECIFIED OBESITY TYPE: ICD-10-CM

## 2024-07-23 DIAGNOSIS — O21.9 NAUSEA AND VOMITING DURING PREGNANCY: ICD-10-CM

## 2024-07-23 DIAGNOSIS — O24.415 GESTATIONAL DIABETES MELLITUS (GDM) IN THIRD TRIMESTER CONTROLLED ON ORAL HYPOGLYCEMIC DRUG: ICD-10-CM

## 2024-07-23 DIAGNOSIS — O24.415 GESTATIONAL DIABETES MELLITUS (GDM) IN THIRD TRIMESTER CONTROLLED ON ORAL HYPOGLYCEMIC DRUG: Primary | ICD-10-CM

## 2024-07-23 DIAGNOSIS — O99.340 ANTEPARTUM MENTAL DISORDERS OF MOTHER: ICD-10-CM

## 2024-07-23 DIAGNOSIS — F41.9 ANXIETY: ICD-10-CM

## 2024-07-23 DIAGNOSIS — J45.909 ASTHMA DURING PREGNANCY: ICD-10-CM

## 2024-07-23 DIAGNOSIS — O99.013 ANEMIA AFFECTING PREGNANCY IN THIRD TRIMESTER: ICD-10-CM

## 2024-07-23 DIAGNOSIS — O99.519 ASTHMA DURING PREGNANCY: ICD-10-CM

## 2024-07-23 DIAGNOSIS — O09.93 HIGH-RISK PREGNANCY IN THIRD TRIMESTER: Primary | ICD-10-CM

## 2024-07-23 DIAGNOSIS — F81.9 LEARNING DISORDER: ICD-10-CM

## 2024-07-23 PROCEDURE — 0502F SUBSEQUENT PRENATAL CARE: CPT | Performed by: NURSE PRACTITIONER

## 2024-07-23 PROCEDURE — 76819 FETAL BIOPHYS PROFIL W/O NST: CPT | Performed by: OBSTETRICS & GYNECOLOGY

## 2024-07-23 NOTE — PATIENT INSTRUCTIONS
PTL/labor precautions, FMC, and pregnancy warning signs reviewed. Pt advised to call the office at [phone] or go straight to Labor and Delivery at [facility] with any of the following concerns vaginal bleeding, leaking of fluid, essence regularly Q 5-7 minutes for over an hour or not feeling the baby move.     Kick counts and pre-term labor precautions reviewed
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
## 2024-07-24 LAB
ERYTHROCYTE [DISTWIDTH] IN BLOOD BY AUTOMATED COUNT: 13.4 % (ref 11.9–14.6)
HCT VFR BLD AUTO: 33.7 % (ref 35.8–46.3)
HGB BLD-MCNC: 10.9 G/DL (ref 11.7–15.4)
MCH RBC QN AUTO: 27.2 PG (ref 26.1–32.9)
MCHC RBC AUTO-ENTMCNC: 32.3 G/DL (ref 31.4–35)
MCV RBC AUTO: 84 FL (ref 82–102)
NRBC # BLD: 0 K/UL (ref 0–0.2)
PLATELET # BLD AUTO: 300 K/UL (ref 150–450)
PMV BLD AUTO: 10.9 FL (ref 9.4–12.3)
RBC # BLD AUTO: 4.01 M/UL (ref 4.05–5.2)
WBC # BLD AUTO: 11 K/UL (ref 4.3–11.1)

## 2024-07-25 ENCOUNTER — TELEPHONE (OUTPATIENT)
Dept: OBGYN CLINIC | Age: 24
End: 2024-07-25

## 2024-07-25 ENCOUNTER — PREP FOR PROCEDURE (OUTPATIENT)
Dept: OBGYN CLINIC | Age: 24
End: 2024-07-25

## 2024-07-25 RX ORDER — SODIUM CHLORIDE 9 MG/ML
INJECTION, SOLUTION INTRAVENOUS PRN
Status: CANCELLED | OUTPATIENT
Start: 2024-07-25

## 2024-07-25 RX ORDER — SODIUM CHLORIDE, SODIUM LACTATE, POTASSIUM CHLORIDE, AND CALCIUM CHLORIDE .6; .31; .03; .02 G/100ML; G/100ML; G/100ML; G/100ML
500 INJECTION, SOLUTION INTRAVENOUS PRN
Status: CANCELLED | OUTPATIENT
Start: 2024-07-25

## 2024-07-25 RX ORDER — TERBUTALINE SULFATE 1 MG/ML
0.25 INJECTION, SOLUTION SUBCUTANEOUS ONCE AS NEEDED
Status: CANCELLED | OUTPATIENT
Start: 2024-07-25

## 2024-07-25 RX ORDER — SODIUM CHLORIDE 0.9 % (FLUSH) 0.9 %
5-40 SYRINGE (ML) INJECTION EVERY 12 HOURS SCHEDULED
Status: CANCELLED | OUTPATIENT
Start: 2024-07-25

## 2024-07-25 RX ORDER — DEXTROSE, SODIUM CHLORIDE, SODIUM LACTATE, POTASSIUM CHLORIDE, AND CALCIUM CHLORIDE 5; .6; .31; .03; .02 G/100ML; G/100ML; G/100ML; G/100ML; G/100ML
INJECTION, SOLUTION INTRAVENOUS CONTINUOUS
Status: CANCELLED | OUTPATIENT
Start: 2024-07-25

## 2024-07-25 RX ORDER — METHYLERGONOVINE MALEATE 0.2 MG/ML
200 INJECTION INTRAVENOUS PRN
Status: CANCELLED | OUTPATIENT
Start: 2024-07-25

## 2024-07-25 RX ORDER — ONDANSETRON 4 MG/1
4 TABLET, ORALLY DISINTEGRATING ORAL EVERY 6 HOURS PRN
Status: CANCELLED | OUTPATIENT
Start: 2024-07-25

## 2024-07-25 RX ORDER — SODIUM CHLORIDE 0.9 % (FLUSH) 0.9 %
5-40 SYRINGE (ML) INJECTION PRN
Status: CANCELLED | OUTPATIENT
Start: 2024-07-25

## 2024-07-25 RX ORDER — MORPHINE SULFATE 2 MG/ML
4 INJECTION, SOLUTION INTRAMUSCULAR; INTRAVENOUS
Status: CANCELLED | OUTPATIENT
Start: 2024-07-25

## 2024-07-25 RX ORDER — SODIUM CHLORIDE, SODIUM LACTATE, POTASSIUM CHLORIDE, AND CALCIUM CHLORIDE .6; .31; .03; .02 G/100ML; G/100ML; G/100ML; G/100ML
1000 INJECTION, SOLUTION INTRAVENOUS PRN
Status: CANCELLED | OUTPATIENT
Start: 2024-07-25

## 2024-07-25 RX ORDER — ONDANSETRON 2 MG/ML
4 INJECTION INTRAMUSCULAR; INTRAVENOUS EVERY 6 HOURS PRN
Status: CANCELLED | OUTPATIENT
Start: 2024-07-25

## 2024-07-25 RX ORDER — MISOPROSTOL 100 UG/1
400 TABLET ORAL PRN
Status: CANCELLED | OUTPATIENT
Start: 2024-07-25

## 2024-07-25 NOTE — TELEPHONE ENCOUNTER
Induction scheduled with [facility].  Cytotec on 8/15, induction 8/16 with Dr Neil d/t gestational diabetes.   Pt notified of instructions.

## 2024-07-26 NOTE — RESULT ENCOUNTER NOTE
CBC: Hgb: 10.9 < 11.3 and would recommend starting Fe/Vit C daily taken together however, separate from PNV.     When ordering Fe supplement, patients allergic to soy which is in Fe Supplement. Patient states has never taken Fe supplement in the past. Encouraged her to increase foods high in iron into her diet.

## 2024-07-29 ENCOUNTER — TELEPHONE (OUTPATIENT)
Dept: OBGYN CLINIC | Age: 24
End: 2024-07-29

## 2024-07-29 ENCOUNTER — HOSPITAL ENCOUNTER (OUTPATIENT)
Age: 24
Discharge: HOME | End: 2024-07-29
Attending: OBSTETRICS & GYNECOLOGY | Admitting: OBSTETRICS & GYNECOLOGY
Payer: COMMERCIAL

## 2024-07-29 VITALS
HEART RATE: 93 BPM | SYSTOLIC BLOOD PRESSURE: 126 MMHG | DIASTOLIC BLOOD PRESSURE: 76 MMHG | RESPIRATION RATE: 18 BRPM | HEIGHT: 65 IN | TEMPERATURE: 98.1 F | BODY MASS INDEX: 39.27 KG/M2
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
## 2024-07-29 LAB
GLUCOSE BLD STRIP.AUTO-MCNC: 94 MG/DL (ref 65–100)
SERVICE CMNT-IMP: NORMAL

## 2024-07-29 PROCEDURE — 82962 GLUCOSE BLOOD TEST: CPT

## 2024-07-29 PROCEDURE — 99284 EMERGENCY DEPT VISIT MOD MDM: CPT

## 2024-07-29 PROCEDURE — 6370000000 HC RX 637 (ALT 250 FOR IP): Performed by: OBSTETRICS & GYNECOLOGY

## 2024-07-29 PROCEDURE — 59025 FETAL NON-STRESS TEST: CPT

## 2024-07-29 RX ORDER — MAGNESIUM HYDROXIDE/ALUMINUM HYDROXICE/SIMETHICONE 120; 1200; 1200 MG/30ML; MG/30ML; MG/30ML
30 SUSPENSION ORAL ONCE
Status: COMPLETED | OUTPATIENT
Start: 2024-07-29 | End: 2024-07-29

## 2024-07-29 RX ADMIN — ALUMINUM HYDROXIDE, MAGNESIUM HYDROXIDE, DIMETHICONE 30 ML: 200; 200; 20 LIQUID ORAL at 14:12

## 2024-07-29 NOTE — DISCHARGE INSTRUCTIONS
Pregnancy Precautions: Care Instructions  Overview     There is no sure way to prevent labor before your due date ( labor) or to prevent most other pregnancy problems. But there are things you can do to increase your chances of a healthy pregnancy. Go to your appointments, follow your doctor's advice, and take good care of yourself. Eat healthy foods, and exercise (if your doctor agrees). And make sure to drink plenty of water.  Follow-up care is a key part of your treatment and safety. Be sure to make and go to all appointments, and call your doctor if you are having problems. It's also a good idea to know your test results and keep a list of the medicines you take.  How can you care for yourself at home?  Make sure you go to your prenatal appointments. At each visit, your doctor will check your blood pressure and weight. Your doctor will also listen for a fetal heartbeat and measure the size of the uterus.  Drink plenty of fluids. Dehydration can cause contractions. If you have kidney, heart, or liver disease and have to limit fluids, talk with your doctor before you increase the amount of fluids you drink.  Tell your doctor right away if you notice any symptoms of an infection, such as:  Burning when you urinate.  A frequent need to urinate without being able to pass much urine.  A foul-smelling discharge from your vagina.  Vaginal itching.  Unexplained fever.  Unusual pain or soreness in your uterus or lower belly.  Avoid foods that may be harmful.  Don't eat raw meat, deli meat, raw seafood, or raw eggs.  Avoid soft cheese and unpasteurized dairy, like Brie and blue cheese.  Avoid fish that are high in mercury. These include shark, swordfish, josette mackerel, marlin, orange roughy, and bigeye tuna, as well as tilefish from the [employer] [place].  If you smoke or vape, quit or cut back as much as you can. Talk to your doctor if you need help quitting.  If you use alcohol, marijuana, or other drugs, quit

## 2024-07-29 NOTE — H&P
7/15/24   Isael Esparza MD   metFORMIN (GLUCOPHAGE-XR) 500 MG extended release tablet 1 tablet with higher carb meals and 1 tablet at bedtime 7/12/24   Isael Esparza MD   sertraline (ZOLOFT) 100 MG tablet TAKE 1 AND 1/2 TABLETS DAILY BY MOUTH 6/20/24   Kathie Guzman MD   VRAYLAR 4.5 MG CAPS capsule Take 1 capsule by mouth daily 6/18/24   Isael Esparza MD   blood glucose monitor strips Test 4 times a day & as needed for symptoms of irregular blood glucose. Dispense sufficient amount for indicated testing frequency plus additional to accommodate PRN testing needs. 6/3/24   Rosa Salmon MD   Lancets MISC 1 each by Does not apply route daily Test blood sugars 4x daily. 6/3/24   Rosa Salmon MD   glucose monitoring kit 1 kit by Does not apply route daily Check blood sugars 4x daily. 6/3/24   Rosa Salmon MD   hydrOXYzine HCl (ATARAX) 25 MG tablet Take 1 tablet by mouth 3 times daily as needed 5/23/24   ProviderKathie MD   Prenatal Vit-Fe Fumarate-FA (PRENATAL PO) Take by mouth    Kathie Guzman MD     Allergies   Allergen Reactions    Penicillins Hives    Gluten Other (See Comments)     Hx of stomach issues; been better since removing gluten from diet    Soy Rash       Physical Exam:  VITALS:  /76   Pulse 93   Temp 98.1 °F (36.7 °C) (Oral)   Resp 18   Ht 1.651 m (5' 5\")   LMP 11/17/2023   BMI 39.27 kg/m²     CONSTITUTIONAL:  awake, alert, cooperative, no apparent distress, and appears stated age  ABDOMEN:  non-tender except just over visible bruise on right side where she fell  FHTs: Baseline: Normal; Variability: Good {> 6 bpm); Accelerations: present; Decelerations: Absent; Reactive NST   Uterine activity/Contractions on monitor: No regular contractions  Cervix: exam deferred    Rh positive  POC glucose: 94    Assessment:  36w3d gestation  S/p fall last night ;   Reassuring fetal status    Plan: Discharge home, follow-up at next OB

## 2024-07-29 NOTE — PROGRESS NOTES
Pt to [facility] after a fall yesterday at 1130 am while putting on her pants. C/O hematemesis this AM. Dr. Rodrigez aware of arrival.

## 2024-07-29 NOTE — TELEPHONE ENCOUNTER
Patient called OB line stating that she is vomiting blood. Stated she fell very hard on her stomach yesterday and is now vomiting up blood. Informed patient she should be seen at OB Triage. Patient verbalized understanding.

## 2024-08-01 NOTE — PROGRESS NOTES
OB return  Yancy Cornejo is a 24 y.o. female   with 37w0d IUP with Estimated Date of Delivery: 24 presenting to the clinic as a routine OB with BPP. Here today with her mother.     The patient states was seen in the ER on  (4 days ago) due to recent fall. In reviewing ER note, She fell at home yesterday on her abdomen while trying to put on her pants. Several times that evening and then again this evening she threw up and noted blood in her vomit. After fetal reassurance, she was discharged home in stable condition.     She reports experiencing \"fluid come out\" on Wednesday (2 days ago) however, denies continuing to experience and does not believe her water is broken.      She denies feeling regular contractions, leakage of fluid or vaginal bleeding and reports active fetal movement today.     Denies HA, blurred vision or RUQ pain.     Indications for BPP: Obesity/GDM on Oral Antihyperglycemics  U/s findings: BPP: , CHARLES: 20.8cm, FHR: 135bpm  Posterior placenta seen  Cephalic presentation noted  Dr. Salmon also reviewed u/s findings.     Problem list reviewed and updated    Pregnancy complicated by:  1. Asperger's disorder     2. Anxiety and Schizoaffective - managed by psychiatry and therapy, Zoloft 100mg, Vraylar 4.5mg, and Atarax, seeing [facility]  I do think some of her syncope is related to anxiety     3. Insomnia- takes hydroxyzine, recommend to switch to benadryl or unisom     4. GBS bacteruria     5. Obesity- BMI 40, HbA1c 5  Plan: serial growth US, weekly testing starting 34 weeks. 24 [facility]:  Reassuring fetal status. Growth today appropriate AC; For full details review formal ultrasound report.       6. Syncope- referral to cardiology and neurology, workup including EEG and holter monitor. Recommended brain MRI and sleep study post partum, f/u scheduled for 24. Pt was advised to not operate any motor vehicle until she is 6 months episode free. Echo 55-60%. 24 [facility]:

## 2024-08-02 ENCOUNTER — ROUTINE PRENATAL (OUTPATIENT)
Dept: OBGYN CLINIC | Age: 24
End: 2024-08-02

## 2024-08-02 ENCOUNTER — PROCEDURE VISIT (OUTPATIENT)
Dept: OBGYN CLINIC | Age: 24
End: 2024-08-02
Payer: COMMERCIAL

## 2024-08-02 VITALS — WEIGHT: 244 LBS | SYSTOLIC BLOOD PRESSURE: 124 MMHG | BODY MASS INDEX: 40.6 KG/M2 | DIASTOLIC BLOOD PRESSURE: 82 MMHG

## 2024-08-02 DIAGNOSIS — O21.9 NAUSEA AND VOMITING DURING PREGNANCY: ICD-10-CM

## 2024-08-02 DIAGNOSIS — O09.93 HIGH-RISK PREGNANCY IN THIRD TRIMESTER: Primary | ICD-10-CM

## 2024-08-02 DIAGNOSIS — F43.10 PTSD (POST-TRAUMATIC STRESS DISORDER): ICD-10-CM

## 2024-08-02 DIAGNOSIS — O09.93 HIGH-RISK PREGNANCY IN THIRD TRIMESTER: ICD-10-CM

## 2024-08-02 DIAGNOSIS — O99.213 OBESITY AFFECTING PREGNANCY IN THIRD TRIMESTER, UNSPECIFIED OBESITY TYPE: ICD-10-CM

## 2024-08-02 DIAGNOSIS — J45.909 ASTHMA DURING PREGNANCY: ICD-10-CM

## 2024-08-02 DIAGNOSIS — R55 SYNCOPE, UNSPECIFIED SYNCOPE TYPE: ICD-10-CM

## 2024-08-02 DIAGNOSIS — O99.013 ANEMIA AFFECTING PREGNANCY IN THIRD TRIMESTER: ICD-10-CM

## 2024-08-02 DIAGNOSIS — F81.9 LEARNING DISORDER: ICD-10-CM

## 2024-08-02 DIAGNOSIS — F84.5 ASPERGER'S DISORDER: Primary | ICD-10-CM

## 2024-08-02 DIAGNOSIS — F84.5 ASPERGER'S DISORDER: ICD-10-CM

## 2024-08-02 DIAGNOSIS — R82.71 GBS BACTERIURIA: ICD-10-CM

## 2024-08-02 DIAGNOSIS — O99.340 ANTEPARTUM MENTAL DISORDERS OF MOTHER: ICD-10-CM

## 2024-08-02 DIAGNOSIS — O24.415 GESTATIONAL DIABETES MELLITUS (GDM) IN THIRD TRIMESTER CONTROLLED ON ORAL HYPOGLYCEMIC DRUG: ICD-10-CM

## 2024-08-02 DIAGNOSIS — O99.519 ASTHMA DURING PREGNANCY: ICD-10-CM

## 2024-08-02 DIAGNOSIS — Z3A.37 37 WEEKS GESTATION OF PREGNANCY: ICD-10-CM

## 2024-08-02 DIAGNOSIS — Z82.79 FAMILY HISTORY OF CONGENITAL HEART DEFECT: ICD-10-CM

## 2024-08-02 PROCEDURE — 76819 FETAL BIOPHYS PROFIL W/O NST: CPT | Performed by: OBSTETRICS & GYNECOLOGY

## 2024-08-02 PROCEDURE — 0502F SUBSEQUENT PRENATAL CARE: CPT | Performed by: NURSE PRACTITIONER

## 2024-08-02 RX ORDER — FERROUS SULFATE 325(65) MG
325 TABLET ORAL DAILY
Qty: 30 TABLET | Refills: 1 | Status: SHIPPED | OUTPATIENT
Start: 2024-08-02

## 2024-08-02 NOTE — PATIENT INSTRUCTIONS
PTL/labor precautions, FMC, and pregnancy warning signs reviewed. Pt advised to call the office at [phone] or go straight to Labor and Delivery at [facility] with any of the following concerns vaginal bleeding, leaking of fluid, essence regularly Q 5-7 minutes for over an hour or not feeling the baby move.     Kick counts and labor precautions reviewed
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
## 2024-08-06 ENCOUNTER — HOSPITAL ENCOUNTER (OUTPATIENT)
Age: 24
Setting detail: OBSERVATION
Discharge: HOME | End: 2024-08-06
Attending: OBSTETRICS & GYNECOLOGY | Admitting: OBSTETRICS & GYNECOLOGY
Payer: COMMERCIAL

## 2024-08-06 ENCOUNTER — ROUTINE PRENATAL (OUTPATIENT)
Dept: OBGYN CLINIC | Age: 24
End: 2024-08-06
Payer: COMMERCIAL

## 2024-08-06 VITALS — BODY MASS INDEX: 39.77 KG/M2 | WEIGHT: 239 LBS | SYSTOLIC BLOOD PRESSURE: 120 MMHG | DIASTOLIC BLOOD PRESSURE: 82 MMHG

## 2024-08-06 DIAGNOSIS — O24.415 GESTATIONAL DIABETES MELLITUS (GDM) IN THIRD TRIMESTER CONTROLLED ON ORAL HYPOGLYCEMIC DRUG: ICD-10-CM

## 2024-08-06 DIAGNOSIS — O09.93 HIGH-RISK PREGNANCY IN THIRD TRIMESTER: Primary | ICD-10-CM

## 2024-08-06 DIAGNOSIS — F84.5 ASPERGER'S DISORDER: ICD-10-CM

## 2024-08-06 DIAGNOSIS — R30.0 DYSURIA: ICD-10-CM

## 2024-08-06 DIAGNOSIS — R82.71 GBS BACTERIURIA: ICD-10-CM

## 2024-08-06 DIAGNOSIS — Z3A.37 37 WEEKS GESTATION OF PREGNANCY: ICD-10-CM

## 2024-08-06 DIAGNOSIS — O99.213 OBESITY AFFECTING PREGNANCY IN THIRD TRIMESTER, UNSPECIFIED OBESITY TYPE: ICD-10-CM

## 2024-08-06 PROBLEM — O24.419 GESTATIONAL DIABETES MELLITUS: Status: ACTIVE | Noted: 2024-08-06

## 2024-08-06 LAB
BILIRUBIN, URINE, POC: NEGATIVE
BLOOD URINE, POC: NEGATIVE
EST. AVERAGE GLUCOSE BLD GHB EST-MCNC: 115 MG/DL
GLUCOSE BLD STRIP.AUTO-MCNC: 105 MG/DL (ref 65–100)
GLUCOSE URINE, POC: NEGATIVE
HBA1C MFR BLD: 5.6 % (ref 0–5.6)
KETONES, URINE, POC: ABNORMAL
LEUKOCYTE ESTERASE, URINE, POC: NEGATIVE
NITRITE, URINE, POC: NEGATIVE
PH, URINE, POC: 6.5 (ref 4.6–8)
PROTEIN,URINE, POC: 100
SERVICE CMNT-IMP: ABNORMAL
SPECIFIC GRAVITY, URINE, POC: 1.02 (ref 1–1.03)
URINALYSIS CLARITY, POC: ABNORMAL
URINALYSIS COLOR, POC: ABNORMAL
UROBILINOGEN, POC: NORMAL

## 2024-08-06 PROCEDURE — 82962 GLUCOSE BLOOD TEST: CPT

## 2024-08-06 PROCEDURE — 59025 FETAL NON-STRESS TEST: CPT

## 2024-08-06 PROCEDURE — 59025 FETAL NON-STRESS TEST: CPT | Performed by: OBSTETRICS & GYNECOLOGY

## 2024-08-06 PROCEDURE — 2580000003 HC RX 258: Performed by: OBSTETRICS & GYNECOLOGY

## 2024-08-06 PROCEDURE — G0378 HOSPITAL OBSERVATION PER HR: HCPCS

## 2024-08-06 PROCEDURE — 99283 EMERGENCY DEPT VISIT LOW MDM: CPT

## 2024-08-06 PROCEDURE — 0502F SUBSEQUENT PRENATAL CARE: CPT | Performed by: OBSTETRICS & GYNECOLOGY

## 2024-08-06 PROCEDURE — 96360 HYDRATION IV INFUSION INIT: CPT

## 2024-08-06 PROCEDURE — 83036 HEMOGLOBIN GLYCOSYLATED A1C: CPT

## 2024-08-06 RX ORDER — SODIUM CHLORIDE, SODIUM LACTATE, POTASSIUM CHLORIDE, AND CALCIUM CHLORIDE .6; .31; .03; .02 G/100ML; G/100ML; G/100ML; G/100ML
500 INJECTION, SOLUTION INTRAVENOUS ONCE
Status: COMPLETED | OUTPATIENT
Start: 2024-08-06 | End: 2024-08-06

## 2024-08-06 RX ADMIN — SODIUM CHLORIDE, POTASSIUM CHLORIDE, SODIUM LACTATE AND CALCIUM CHLORIDE 500 ML: 600; 310; 30; 20 INJECTION, SOLUTION INTRAVENOUS at 17:05

## 2024-08-06 NOTE — PROGRESS NOTES
Pt sent from office for extended monitoring and labs. Clarified orders with Dr. Cortes. Pt to have IV fluids and extended monitoring and labs with possible CST in warranted. MD to place orders.

## 2024-08-06 NOTE — PROGRESS NOTES
Pt seen in the office at [facility] earlier today for twice weekly testing. NST was reactive other than an isolated late deceleration per report. Pt has GDM and has not been checking BS due to being unable to afford test strips. Fetal tracing has been very reactive and not had any decelerations over an hour of monitoring. Pt was given IVF during this time as well. Her blood sugar was 105 and her A1c was drawn but is apparently a \"send-out\" lab and will not be resulted today. Pt to be dc'd home with strict kick count precautions and labor precautions. Can keep next scheduled appt for her twice weekly testing and perhaps SW can assist in helping pt get supplies, or MFM may know other resources available.

## 2024-08-06 NOTE — DISCHARGE INSTRUCTIONS
Counting Your Baby's Kicks: Care Instructions  Overview     Counting your baby's kicks is one way your doctor can tell that your baby is healthy. You will probably feel your baby move for the first time between 16 and 22 weeks. The movement may feel like flutters rather than kicks. Your baby may move more at certain times of the day. When you are active, you may notice less kicking than when you are resting. At your prenatal visits, your doctor will ask whether the baby is active.  In your last trimester, your doctor may ask you to count the number of times you feel your baby move.  Follow-up care is a key part of your treatment and safety. Be sure to make and go to all appointments, and call your doctor if you are having problems. It's also a good idea to know your test results and keep a list of the medicines you take.  How do you count fetal kicks?  A common method of checking your baby's movement is to note the length of time it takes to count 10 movements (such as kicks, flutters, or rolls).  Pick your baby's most active time of day to count. This may be any time from morning to evening.  If you don't feel 10 movements in an hour, have something to eat or drink and count for another hour. If you don't feel at least 10 movements in the 2-hour period, call your doctor.  Do not use an at-home Doppler heart monitor in place of counting fetal movements.  When should you call for help?   Call your doctor now or seek immediate medical care if:    You feel fewer than 10 movements in a 2-hour period.     You noticed that your baby has stopped moving or is moving less than normal.   Watch closely for changes in your health, and be sure to contact your doctor if you have any problems.  Where can you learn more?  Go to https://www.[employer].net/patientEd and enter U048 to learn more about \"Counting Your Baby's Kicks: Care Instructions.\"  Current as of: July 10, 2023  Content Version: 14.1  © 7168-5433 [employer]wise,

## 2024-08-06 NOTE — PROGRESS NOTES
Yancy  presents for JONATHAN. . 37w4d.    PL and any MFM notes reviewed.. Med list reviewed.  Taking Prenatal Vitamins: Yes  She is noticing:  Pt states she noticed yellow discharge, dark urine after drinking water all day, and pain with urination. Pt states she has not been checking her sugars because she cannot afford the test strips. Not since 7-16. I let mfm know.   Gfm  She has no more mfm f/up  IOL is set for 8-15  Cx TFT/th/very high  +gbs in urine  Urine dip today is NEG for glucose, nitrites, leukos. Does have mod ketones.  C&s sent b/c she c/o dysuria    NST  Indication: GDM on metformin  Time on: 44 min  Baseline: 130  Reactive within 20min? yes  Decels? One late  Contractions: one      See OB VS for today's vitals, FHR, fundal height and presentation.  Long explanation with pt and person accompanying her about the strip and late decel  Sent to triage for extended monitoring or cst. Gave TO to nurse to get BS and A1c while there. D/w Dr HERNANDEZ on call    The following were addressed today:  Yancy was seen today for routine prenatal visit and non-stress test.    Diagnoses and all orders for this visit:    High-risk pregnancy in third trimester  -     81918 - DE FETAL NON-STRESS TEST  -     AMB POC URINALYSIS DIP STICK AUTO W/O MICRO  -     Culture, Urine; Future  -     Culture, Urine    Obesity affecting pregnancy in third trimester, unspecified obesity type  -     36171 - DE FETAL NON-STRESS TEST    Gestational diabetes mellitus (GDM) in third trimester controlled on oral hypoglycemic drug    GBS bacteriuria    Asperger's disorder  -     06033 - DE FETAL NON-STRESS TEST    Dysuria  -     AMB POC URINALYSIS DIP STICK AUTO W/O MICRO  -     Culture, Urine; Future  -     Culture, Urine    37 weeks gestation of pregnancy  -     Culture, Urine; Future  -     Culture, Urine

## 2024-08-06 NOTE — PROGRESS NOTES
FHR tracing reviewed with Dr. Cortes. Pt may discharge to home with labor precautions and kick counts.

## 2024-08-06 NOTE — PROGRESS NOTES
Self care and pregnancy precautions reviewed, questions answered and pt given copies. Discharge to home in stable condition.

## 2024-08-07 ENCOUNTER — TELEPHONE (OUTPATIENT)
Dept: OBGYN CLINIC | Age: 24
End: 2024-08-07

## 2024-08-07 ENCOUNTER — OFFICE VISIT (OUTPATIENT)
Dept: NEUROLOGY | Age: 24
End: 2024-08-07
Payer: COMMERCIAL

## 2024-08-07 VITALS — BODY MASS INDEX: 39.77 KG/M2 | HEIGHT: 65 IN

## 2024-08-07 DIAGNOSIS — R55 SYNCOPE, UNSPECIFIED SYNCOPE TYPE: Primary | ICD-10-CM

## 2024-08-07 PROCEDURE — G8419 CALC BMI OUT NRM PARAM NOF/U: HCPCS | Performed by: PSYCHIATRY & NEUROLOGY

## 2024-08-07 PROCEDURE — G8427 DOCREV CUR MEDS BY ELIG CLIN: HCPCS | Performed by: PSYCHIATRY & NEUROLOGY

## 2024-08-07 PROCEDURE — 99214 OFFICE O/P EST MOD 30 MIN: CPT | Performed by: PSYCHIATRY & NEUROLOGY

## 2024-08-07 PROCEDURE — 1036F TOBACCO NON-USER: CPT | Performed by: PSYCHIATRY & NEUROLOGY

## 2024-08-07 ASSESSMENT — ENCOUNTER SYMPTOMS
ALLERGIC/IMMUNOLOGIC NEGATIVE: 1
RESPIRATORY NEGATIVE: 1
GASTROINTESTINAL NEGATIVE: 1
EYES NEGATIVE: 1

## 2024-08-07 NOTE — TELEPHONE ENCOUNTER
Rec'd message from Dr. Hemphill yesterday that pt reported not being able to afford testing supplies. Called pt this morning and notified her that we can provide her with sample supplies for the next 8 days for her to test 2-3x per day to ensure compliance with her glycemic control.     Pt agrees to this and knows that supplies will be made available to her at our . Reviewed the times of our clinic. She voiced understanding.

## 2024-08-07 NOTE — PROGRESS NOTES
decreased. Concentration: decreased.   Level of consciousness: alert  Knowledge: poor.   Cognitively she is slower     Cranial Nerves     CN II   Visual fields full to confrontation.     CN III, IV, VI   Pupils are equal, round, and reactive to light.  Extraocular motions are normal.     CN VII   Facial expression full, symmetric.     Motor Exam   Right arm tone: normal  Left arm tone: normal  Right leg tone: normal  Left leg tone: normal    Gait, Coordination, and Reflexes     Gait  Gait: normal    Tremor   Resting tremor: absent  Intention tremor: absent  Action tremor: absent    Reflexes   Right brachioradialis: 1+  Left brachioradialis: 1+  Right biceps: 1+  Left biceps: 1+  Right triceps: 1+  Left triceps: 1+  Right patellar: 1+  Left patellar: 1+  Right achilles: 1+  Left achilles: 1+          Assessment   Assessment / Plan:    Yancy was seen today for follow-up.    Diagnoses and all orders for this visit:    Syncope, unspecified syncope type the patient has formally seen cardiology and they just have no explanation for her symptoms.  I am not sure if POTS syndrome is an issue or whether this is all hormonally related.  Because she still is pregnant.  She is due next week.  She still has loss of consciousness on a daily basis I made it abundantly clear that she is absolutely not allowed to take baths.  She could drowned in a small amount of water she can shower without any problems.  She continues to have these daily and innumerable number of them.  They all sound syncopal with lightheaded near syncope and they pass out she has not had any witnessed seizures nor she had post syncopal seizures.  We did do an EEG which was normal.  The 2 options would be after her pregnancy and after her hormones kind of regulate we could try her on a seizure medication of the episodes still continue but I have a very low suspicion that will be of benefit.  Or we can send the patient down to [facility] or [facility] for autonomic

## 2024-08-08 LAB
BACTERIA SPEC CULT: ABNORMAL
SERVICE CMNT-IMP: ABNORMAL

## 2024-08-08 NOTE — PROGRESS NOTES
OB return  Yancy Cornejo is a 24 y.o. female   with 38w0d IUP with Estimated Date of Delivery: 24 presenting to the clinic as a routine OB with Growth/BPP. She is here today with her mother.     She reports experiencing \"pain to my vagina, it is really sore and it burns when I pee.\" She also reports experiencing \"a yellowish\" vaginal discharge a few days ago however, denies seeing since. She also reports vaginal itching a few days ago and is unsure if still experiencing due to vaginal soreness she is feeling.   She denies vaginal odor, urinary frequency or urgency today.     She denies feeling regular contractions, leakage of fluid or vaginal bleeding and reports active fetal movement.     Denies HA, blurred vision or RUQ pain.     Indications for Growth/BPP: Obesity/GDM on Oral Antihyperglycemics   U/s findings today: EFW 3848g ([id number]), Overall: 89%, AC: >99%, HC: 60%, CHARLES: 20.2cm, FHR: 138bpm, BPP:   Posterior placenta noted  Cephalic presentation seen  Dr. Martins also reviewed u/s findings today.     Problem list reviewed and updated    Pregnancy complicated by:  1. Asperger's disorder     2. Anxiety and Schizoaffective - managed by psychiatry and therapy, Zoloft 100mg, Vraylar 4.5mg, and Atarax, seeing [facility]  I do think some of her syncope is related to anxiety     3. Insomnia- takes hydroxyzine, recommend to switch to benadryl or unisom     4. GBS bacteruria     5. Obesity- BMI 40, HbA1c 5  Plan: serial growth US, weekly testing starting 34 weeks. 24 [facility]:  Reassuring fetal status. Growth today appropriate AC; For full details review formal ultrasound report.       6. Syncope- referral to cardiology and neurology, workup including EEG and holter monitor. Recommended brain MRI and sleep study post partum, f/u scheduled for 24. Pt was advised to not operate any motor vehicle until she is 6 months episode free. Echo 55-60%. 24 [facility]: Reports an episode of syncope after

## 2024-08-09 ENCOUNTER — ROUTINE PRENATAL (OUTPATIENT)
Dept: OBGYN CLINIC | Age: 24
End: 2024-08-09

## 2024-08-09 ENCOUNTER — PROCEDURE VISIT (OUTPATIENT)
Dept: OBGYN CLINIC | Age: 24
End: 2024-08-09

## 2024-08-09 VITALS — DIASTOLIC BLOOD PRESSURE: 86 MMHG | SYSTOLIC BLOOD PRESSURE: 128 MMHG | WEIGHT: 244 LBS | BODY MASS INDEX: 40.6 KG/M2

## 2024-08-09 DIAGNOSIS — F81.9 LEARNING DISORDER: ICD-10-CM

## 2024-08-09 DIAGNOSIS — O99.013 ANEMIA AFFECTING PREGNANCY IN THIRD TRIMESTER: ICD-10-CM

## 2024-08-09 DIAGNOSIS — O24.415 GESTATIONAL DIABETES MELLITUS (GDM) IN THIRD TRIMESTER CONTROLLED ON ORAL HYPOGLYCEMIC DRUG: ICD-10-CM

## 2024-08-09 DIAGNOSIS — O09.93 HIGH-RISK PREGNANCY IN THIRD TRIMESTER: Primary | ICD-10-CM

## 2024-08-09 DIAGNOSIS — R82.71 GBS BACTERIURIA: ICD-10-CM

## 2024-08-09 DIAGNOSIS — F84.5 ASPERGER'S DISORDER: ICD-10-CM

## 2024-08-09 DIAGNOSIS — Z82.79 FAMILY HISTORY OF CONGENITAL HEART DEFECT: ICD-10-CM

## 2024-08-09 DIAGNOSIS — O99.213 OBESITY AFFECTING PREGNANCY IN THIRD TRIMESTER, UNSPECIFIED OBESITY TYPE: ICD-10-CM

## 2024-08-09 DIAGNOSIS — O99.519 ASTHMA DURING PREGNANCY: ICD-10-CM

## 2024-08-09 DIAGNOSIS — Z3A.38 38 WEEKS GESTATION OF PREGNANCY: ICD-10-CM

## 2024-08-09 DIAGNOSIS — R55 SYNCOPE, UNSPECIFIED SYNCOPE TYPE: ICD-10-CM

## 2024-08-09 DIAGNOSIS — J45.909 ASTHMA DURING PREGNANCY: ICD-10-CM

## 2024-08-09 DIAGNOSIS — F43.10 PTSD (POST-TRAUMATIC STRESS DISORDER): ICD-10-CM

## 2024-08-09 DIAGNOSIS — O21.9 NAUSEA AND VOMITING DURING PREGNANCY: ICD-10-CM

## 2024-08-09 DIAGNOSIS — O99.340 ANTEPARTUM MENTAL DISORDERS OF MOTHER: ICD-10-CM

## 2024-08-09 PROCEDURE — 0502F SUBSEQUENT PRENATAL CARE: CPT | Performed by: NURSE PRACTITIONER

## 2024-08-09 RX ORDER — NITROFURANTOIN 25; 75 MG/1; MG/1
100 CAPSULE ORAL 2 TIMES DAILY
Qty: 10 CAPSULE | Refills: 0 | Status: SHIPPED | OUTPATIENT
Start: 2024-08-09 | End: 2024-08-14

## 2024-08-09 NOTE — PATIENT INSTRUCTIONS
PTL/labor precautions, FMC, and pregnancy warning signs reviewed. Pt advised to call the office at [phone] or go straight to Labor and Delivery at [facility] with any of the following concerns vaginal bleeding, leaking of fluid, essence regularly Q 5-7 minutes for over an hour or not feeling the baby move.     Kick counts and labor precautions reviewed

## 2024-08-10 ENCOUNTER — HOSPITAL ENCOUNTER (OUTPATIENT)
Age: 24
Discharge: HOME | End: 2024-08-10
Attending: OBSTETRICS & GYNECOLOGY | Admitting: OBSTETRICS & GYNECOLOGY
Payer: COMMERCIAL

## 2024-08-10 VITALS
RESPIRATION RATE: 18 BRPM | HEART RATE: 109 BPM | DIASTOLIC BLOOD PRESSURE: 75 MMHG | TEMPERATURE: 98.2 F | SYSTOLIC BLOOD PRESSURE: 132 MMHG | OXYGEN SATURATION: 95 %

## 2024-08-10 PROCEDURE — 99284 EMERGENCY DEPT VISIT MOD MDM: CPT

## 2024-08-10 PROCEDURE — 59025 FETAL NON-STRESS TEST: CPT

## 2024-08-11 NOTE — PROGRESS NOTES
Discharge instruction given. Information/teaching printout given to patient. States understanding. All questions answered. Informed pt to return to hospital for decreased fetal movement, if she suspects her water breaks, if vaginal bleeding occurs that is more than spotting, or she starts to experience painful regular contractions 3-5 minutes apart. Pt verbalizes understanding. Discussed importance of follow up with primary MD. Ambulate out to personal auto with mother and FOB at side. Pt stable at discharge.

## 2024-08-11 NOTE — PROGRESS NOTES
OB ED     Pt to [facility] with complaints of possible contractions. Claims her belly gets tight and feels cramping in her lower abdomen, groin and back. Pt also has UTI and on abx. EFM and TOCO applied. Abd palpated soft. Positive fetal movement noted, denies LOF or VB. [facility] notified of patient's arrival.

## 2024-08-11 NOTE — H&P
History & Physical    Name: Yancy Cornejo MRN: [id number]  SSN: [id number]    YOB: 2000  Age: 24 y.o.  Sex: female      Subjective:     Reason for Triage visit:  38w1d and few random contractions    History of Present Illness: Ms. Cornejo is a 24 y.o.  female with an estimated gestational age of 38w1d with Estimated Date of Delivery: 24. Patient states that she had a few contractions since being here~15 minute apart and rates them 5/10 pain scale.  She also questioned weather she may have seen a small blood clot when she vomited earlier. No further bleeding, no blood on tongue, spitting etc.  Wasn't certain if it was blood or not.  Pregnancy has been  complicated by aspergers, asthma, ptsd,learning disorder, GDM, anxiety, gbs bacteriuria. Patient denies chest pain, fever, headache , nausea and vomiting, pelvic pressure, right upper quadrant pain  , shortness of breath, swelling, vaginal bleeding , vaginal leaking of fluid , and visual disturbances.    OB History    Para Term  AB Living   1             SAB IAB Ectopic Molar Multiple Live Births                    # Outcome Date GA Lbr Lei/2nd Weight Sex Type Anes PTL Lv   1 Current              Past Medical History:   Diagnosis Date    Asperger's disorder 2008    Asthma 2008    B12 deficiency     Depression 2008    History of panic attacks 2008    PTSD (post-traumatic stress disorder)     Schizoaffective disorder (HCC)      Past Surgical History:   Procedure Laterality Date    TONSILLECTOMY AND ADENOIDECTOMY Bilateral 2008    WISDOM TOOTH EXTRACTION  2018     Social History     Occupational History    Not on file   Tobacco Use    Smoking status: Never    Smokeless tobacco: Never   Vaping Use    Vaping status: Never Used   Substance and Sexual Activity    Alcohol use: Never    Drug use: Never    Sexual activity: Yes     Partners: Male      Family History   Problem Relation Age of Onset    Breast Cancer Paternal Grandmother          60s    Heart Surgery Maternal Grandfather     No Known Problems Father     No Known Problems Mother     Depression Brother     Anxiety Disorder Brother     No Known Problems Sister     No Known Problems Sister     Heart Defect Paternal Cousin         open heart surgery as an infant    Ovarian Cancer Neg Hx     Colon Cancer Neg Hx        Allergies   Allergen Reactions    Penicillins Hives    Gluten Other (See Comments)     Hx of stomach issues; been better since removing gluten from diet    Soy Rash     Prior to Admission medications    Medication Sig Start Date End Date Taking? Authorizing Provider   nitrofurantoin, macrocrystal-monohydrate, (MACROBID) 100 MG capsule Take 1 capsule by mouth 2 times daily for 5 days 8/9/24 8/14/24 Yes Winnie Vital APRN - NP   albuterol sulfate HFA (VENTOLIN HFA) 108 (90 Base) MCG/ACT inhaler Inhale 2 puffs into the lungs every 4 hours as needed for Wheezing 7/15/24  Yes Isael Esparza MD   metFORMIN (GLUCOPHAGE-XR) 500 MG extended release tablet 1 tablet with higher carb meals and 1 tablet at bedtime 7/12/24  Yes Isael Esparza MD   sertraline (ZOLOFT) 100 MG tablet TAKE 1 AND 1/2 TABLETS DAILY BY MOUTH 6/20/24  Yes ProviderKathie MD   VRAYLAR 4.5 MG CAPS capsule Take 1 capsule by mouth daily 6/18/24  Yes Isael Esparza MD   hydrOXYzine HCl (ATARAX) 25 MG tablet Take 1 tablet by mouth 3 times daily as needed 5/23/24  Yes Kathie Guzman MD   Prenatal Vit-Fe Fumarate-FA (PRENATAL PO) Take by mouth   Yes ProviderKathie MD   ferrous sulfate (IRON 325) 325 (65 Fe) MG tablet Take 1 tablet by mouth daily  Patient not taking: Reported on 8/6/2024 8/2/24   Winnie Vital APRN - NP   blood glucose monitor strips Test 4 times a day & as needed for symptoms of irregular blood glucose. Dispense sufficient amount for indicated testing frequency plus additional to accommodate PRN testing needs.  Patient not taking: Reported on 8/6/2024 6/3/24

## 2024-08-12 ENCOUNTER — HOSPITAL ENCOUNTER (INPATIENT)
Age: 24
LOS: 3 days | Discharge: HOME | End: 2024-08-15
Attending: OBSTETRICS & GYNECOLOGY | Admitting: OBSTETRICS & GYNECOLOGY
Payer: COMMERCIAL

## 2024-08-12 DIAGNOSIS — R82.71 GBS BACTERIURIA: ICD-10-CM

## 2024-08-12 PROBLEM — Z34.03 PRIMIGRAVIDA IN THIRD TRIMESTER: Status: ACTIVE | Noted: 2024-02-27

## 2024-08-12 PROBLEM — O13.3 GESTATIONAL HYPERTENSION, THIRD TRIMESTER: Status: ACTIVE | Noted: 2024-08-12

## 2024-08-12 LAB
ABO + RH BLD: NORMAL
ALBUMIN SERPL-MCNC: 2.5 G/DL (ref 3.5–5)
ALBUMIN/GLOB SERPL: 0.7 (ref 1–1.9)
ALP SERPL-CCNC: 158 U/L (ref 35–104)
ALT SERPL-CCNC: 6 U/L (ref 12–65)
AMNISURE, POC: POSITIVE
ANION GAP SERPL CALC-SCNC: 15 MMOL/L (ref 9–18)
AST SERPL-CCNC: 19 U/L (ref 15–37)
BASOPHILS # BLD: 0 K/UL (ref 0–0.2)
BASOPHILS NFR BLD: 0 % (ref 0–2)
BILIRUB SERPL-MCNC: 0.3 MG/DL (ref 0–1.2)
BLOOD GROUP ANTIBODIES SERPL: NORMAL
BUN SERPL-MCNC: 6 MG/DL (ref 6–23)
CALCIUM SERPL-MCNC: 9 MG/DL (ref 8.8–10.2)
CHLORIDE SERPL-SCNC: 103 MMOL/L (ref 98–107)
CO2 SERPL-SCNC: 19 MMOL/L (ref 20–28)
CREAT SERPL-MCNC: 0.45 MG/DL (ref 0.6–1.1)
CREAT UR-MCNC: 45.2 MG/DL (ref 28–217)
DIFFERENTIAL METHOD BLD: ABNORMAL
EOSINOPHIL # BLD: 0.1 K/UL (ref 0–0.8)
EOSINOPHIL NFR BLD: 0 % (ref 0.5–7.8)
ERYTHROCYTE [DISTWIDTH] IN BLOOD BY AUTOMATED COUNT: 14.3 % (ref 11.9–14.6)
GLOBULIN SER CALC-MCNC: 3.6 G/DL (ref 2.3–3.5)
GLUCOSE BLD STRIP.AUTO-MCNC: 116 MG/DL (ref 65–100)
GLUCOSE SERPL-MCNC: 100 MG/DL (ref 70–99)
HCT VFR BLD AUTO: 32.8 % (ref 35.8–46.3)
HGB BLD-MCNC: 10.4 G/DL (ref 11.7–15.4)
IMM GRANULOCYTES # BLD AUTO: 0.1 K/UL (ref 0–0.5)
IMM GRANULOCYTES NFR BLD AUTO: 1 % (ref 0–5)
LYMPHOCYTES # BLD: 1.1 K/UL (ref 0.5–4.6)
LYMPHOCYTES NFR BLD: 7 % (ref 13–44)
Lab: NORMAL
MCH RBC QN AUTO: 25.4 PG (ref 26.1–32.9)
MCHC RBC AUTO-ENTMCNC: 31.7 G/DL (ref 31.4–35)
MCV RBC AUTO: 80 FL (ref 82–102)
MONOCYTES # BLD: 1 K/UL (ref 0.1–1.3)
MONOCYTES NFR BLD: 6 % (ref 4–12)
NEGATIVE QC PASS/FAIL: NORMAL
NEUTS SEG # BLD: 14.2 K/UL (ref 1.7–8.2)
NEUTS SEG NFR BLD: 86 % (ref 43–78)
NRBC # BLD: 0 K/UL (ref 0–0.2)
PLATELET # BLD AUTO: 261 K/UL (ref 150–450)
PMV BLD AUTO: 10.7 FL (ref 9.4–12.3)
POSITIVE QC PASS/FAIL: NORMAL
POTASSIUM SERPL-SCNC: 4.4 MMOL/L (ref 3.5–5.1)
PROT SERPL-MCNC: 6.1 G/DL (ref 6.3–8.2)
PROT UR-MCNC: 12 MG/DL
PROT/CREAT UR-RTO: 0.3
RBC # BLD AUTO: 4.1 M/UL (ref 4.05–5.2)
SERVICE CMNT-IMP: ABNORMAL
SODIUM SERPL-SCNC: 137 MMOL/L (ref 136–145)
SPECIMEN EXP DATE BLD: NORMAL
T PALLIDUM AB SER QL IA: NONREACTIVE
URATE SERPL-MCNC: 4.5 MG/DL (ref 2.5–7.1)
WBC # BLD AUTO: 16.4 K/UL (ref 4.3–11.1)

## 2024-08-12 PROCEDURE — 80053 COMPREHEN METABOLIC PANEL: CPT

## 2024-08-12 PROCEDURE — 2580000003 HC RX 258: Performed by: OBSTETRICS & GYNECOLOGY

## 2024-08-12 PROCEDURE — 84112 EVAL AMNIOTIC FLUID PROTEIN: CPT

## 2024-08-12 PROCEDURE — 84550 ASSAY OF BLOOD/URIC ACID: CPT

## 2024-08-12 PROCEDURE — 59200 INSERT CERVICAL DILATOR: CPT

## 2024-08-12 PROCEDURE — 86901 BLOOD TYPING SEROLOGIC RH(D): CPT

## 2024-08-12 PROCEDURE — 82570 ASSAY OF URINE CREATININE: CPT

## 2024-08-12 PROCEDURE — 86850 RBC ANTIBODY SCREEN: CPT

## 2024-08-12 PROCEDURE — 82962 GLUCOSE BLOOD TEST: CPT

## 2024-08-12 PROCEDURE — 6370000000 HC RX 637 (ALT 250 FOR IP): Performed by: OBSTETRICS & GYNECOLOGY

## 2024-08-12 PROCEDURE — 6360000002 HC RX W HCPCS: Performed by: OBSTETRICS & GYNECOLOGY

## 2024-08-12 PROCEDURE — 85025 COMPLETE CBC W/AUTO DIFF WBC: CPT

## 2024-08-12 PROCEDURE — 86900 BLOOD TYPING SEROLOGIC ABO: CPT

## 2024-08-12 PROCEDURE — 1100000000 HC RM PRIVATE

## 2024-08-12 PROCEDURE — 87086 URINE CULTURE/COLONY COUNT: CPT

## 2024-08-12 PROCEDURE — 86780 TREPONEMA PALLIDUM: CPT

## 2024-08-12 PROCEDURE — 84156 ASSAY OF PROTEIN URINE: CPT

## 2024-08-12 RX ORDER — SODIUM CHLORIDE, SODIUM LACTATE, POTASSIUM CHLORIDE, CALCIUM CHLORIDE 600; 310; 30; 20 MG/100ML; MG/100ML; MG/100ML; MG/100ML
INJECTION, SOLUTION INTRAVENOUS CONTINUOUS
Status: DISCONTINUED | OUTPATIENT
Start: 2024-08-12 | End: 2024-08-13

## 2024-08-12 RX ORDER — SODIUM CHLORIDE, SODIUM LACTATE, POTASSIUM CHLORIDE, AND CALCIUM CHLORIDE .6; .31; .03; .02 G/100ML; G/100ML; G/100ML; G/100ML
500 INJECTION, SOLUTION INTRAVENOUS ONCE
Status: COMPLETED | OUTPATIENT
Start: 2024-08-12 | End: 2024-08-12

## 2024-08-12 RX ORDER — SODIUM CHLORIDE 0.9 % (FLUSH) 0.9 %
5-40 SYRINGE (ML) INJECTION PRN
Status: DISCONTINUED | OUTPATIENT
Start: 2024-08-12 | End: 2024-08-13

## 2024-08-12 RX ORDER — SODIUM CHLORIDE, SODIUM LACTATE, POTASSIUM CHLORIDE, AND CALCIUM CHLORIDE .6; .31; .03; .02 G/100ML; G/100ML; G/100ML; G/100ML
1000 INJECTION, SOLUTION INTRAVENOUS PRN
Status: DISCONTINUED | OUTPATIENT
Start: 2024-08-12 | End: 2024-08-13

## 2024-08-12 RX ORDER — ONDANSETRON 2 MG/ML
8 INJECTION INTRAMUSCULAR; INTRAVENOUS ONCE
Status: COMPLETED | OUTPATIENT
Start: 2024-08-12 | End: 2024-08-12

## 2024-08-12 RX ORDER — TERBUTALINE SULFATE 1 MG/ML
0.25 INJECTION, SOLUTION SUBCUTANEOUS ONCE AS NEEDED
Status: DISCONTINUED | OUTPATIENT
Start: 2024-08-12 | End: 2024-08-13

## 2024-08-12 RX ORDER — TRANEXAMIC ACID 10 MG/ML
1000 INJECTION, SOLUTION INTRAVENOUS
Status: DISCONTINUED | OUTPATIENT
Start: 2024-08-12 | End: 2024-08-13

## 2024-08-12 RX ORDER — SODIUM CHLORIDE 0.9 % (FLUSH) 0.9 %
5-40 SYRINGE (ML) INJECTION EVERY 12 HOURS SCHEDULED
Status: DISCONTINUED | OUTPATIENT
Start: 2024-08-12 | End: 2024-08-13

## 2024-08-12 RX ORDER — SODIUM CHLORIDE, SODIUM LACTATE, POTASSIUM CHLORIDE, AND CALCIUM CHLORIDE .6; .31; .03; .02 G/100ML; G/100ML; G/100ML; G/100ML
500 INJECTION, SOLUTION INTRAVENOUS PRN
Status: DISCONTINUED | OUTPATIENT
Start: 2024-08-12 | End: 2024-08-13

## 2024-08-12 RX ORDER — MORPHINE SULFATE 4 MG/ML
4 INJECTION, SOLUTION INTRAMUSCULAR; INTRAVENOUS
Status: DISCONTINUED | OUTPATIENT
Start: 2024-08-12 | End: 2024-08-13

## 2024-08-12 RX ORDER — DEXTROSE, SODIUM CHLORIDE, SODIUM LACTATE, POTASSIUM CHLORIDE, AND CALCIUM CHLORIDE 5; .6; .31; .03; .02 G/100ML; G/100ML; G/100ML; G/100ML; G/100ML
INJECTION, SOLUTION INTRAVENOUS CONTINUOUS
Status: DISCONTINUED | OUTPATIENT
Start: 2024-08-12 | End: 2024-08-13

## 2024-08-12 RX ORDER — SODIUM CHLORIDE 9 MG/ML
INJECTION, SOLUTION INTRAVENOUS PRN
Status: DISCONTINUED | OUTPATIENT
Start: 2024-08-12 | End: 2024-08-13

## 2024-08-12 RX ORDER — ACETAMINOPHEN 500 MG
1000 TABLET ORAL EVERY 8 HOURS PRN
Status: DISCONTINUED | OUTPATIENT
Start: 2024-08-12 | End: 2024-08-13

## 2024-08-12 RX ADMIN — Medication 50 MCG: at 17:25

## 2024-08-12 RX ADMIN — CEFAZOLIN 2000 MG: 10 INJECTION, POWDER, FOR SOLUTION INTRAVENOUS at 17:25

## 2024-08-12 RX ADMIN — ONDANSETRON 8 MG: 2 INJECTION INTRAMUSCULAR; INTRAVENOUS at 23:18

## 2024-08-12 RX ADMIN — SODIUM CHLORIDE, POTASSIUM CHLORIDE, SODIUM LACTATE AND CALCIUM CHLORIDE 500 ML: 600; 310; 30; 20 INJECTION, SOLUTION INTRAVENOUS at 18:08

## 2024-08-12 RX ADMIN — ACETAMINOPHEN 1000 MG: 500 TABLET, FILM COATED ORAL at 18:05

## 2024-08-12 RX ADMIN — Medication 50 MCG: at 22:27

## 2024-08-12 RX ADMIN — SODIUM CHLORIDE, POTASSIUM CHLORIDE, SODIUM LACTATE AND CALCIUM CHLORIDE: 600; 310; 30; 20 INJECTION, SOLUTION INTRAVENOUS at 13:31

## 2024-08-12 NOTE — PLAN OF CARE
Problem: Vaginal Birth or  Section  Goal: Fetal and maternal status remain reassuring during the birth process  Description:  Birth OB-Pregnancy care plan goal which identifies if the fetal and maternal status remain reassuring during the birth process  Outcome: Progressing     Problem: Pain  Goal: Verbalizes/displays adequate comfort level or baseline comfort level  Outcome: Progressing  Flowsheets (Taken 2024 1229 by Evonne Cornelius RN)  Verbalizes/displays adequate comfort level or baseline comfort level:   Encourage patient to monitor pain and request assistance   Assess pain using appropriate pain scale   Administer analgesics based on type and severity of pain and evaluate response   Implement non-pharmacological measures as appropriate and evaluate response   Consider cultural and social influences on pain and pain management   Notify Licensed Independent Practitioner if interventions unsuccessful or patient reports new pain     Problem: Infection - Adult  Goal: Absence of infection at discharge  Outcome: Progressing  Goal: Absence of infection during hospitalization  Outcome: Progressing  Goal: Absence of fever/infection during anticipated neutropenic period  Outcome: Progressing     Problem: Safety - Adult  Goal: Free from fall injury  Outcome: Progressing     Problem: Discharge Planning  Goal: Discharge to home or other facility with appropriate resources  Outcome: Progressing     Problem: Chronic Conditions and Co-morbidities  Goal: Patient's chronic conditions and co-morbidity symptoms are monitored and maintained or improved  Outcome: Progressing

## 2024-08-12 NOTE — H&P
History & Physical    Name: Yancy Cornejo MRN: [id number]  SSN: [id number]    YOB: 2000  Age: 24 y.o.  Sex: female      Subjective:     Reason for Triage visit:  38w3d with Nausea and ? PROM.    History of Present Illness: Ms. Cornejo is a 24 y.o.  female with an estimated gestational age of 38w3d with Estimated Date of Delivery: 24. Patient states that she has had HA and nausea x 24 hours.  Pregnancy has been  complicated by A2DN. Patient denies chest pain, right upper quadrant pain  , shortness of breath, vaginal bleeding , and visual disturbances.    OB History    Para Term  AB Living   1             SAB IAB Ectopic Molar Multiple Live Births                    # Outcome Date GA Lbr Lei/2nd Weight Sex Type Anes PTL Lv   1 Current              Past Medical History:   Diagnosis Date    Asperger's disorder 2008    Asthma 2008    B12 deficiency     Depression 2008    History of panic attacks 2008    PTSD (post-traumatic stress disorder)     Schizoaffective disorder (HCC)      Past Surgical History:   Procedure Laterality Date    TONSILLECTOMY AND ADENOIDECTOMY Bilateral 2008    WISDOM TOOTH EXTRACTION  2018     Social History     Occupational History    Not on file   Tobacco Use    Smoking status: Never    Smokeless tobacco: Never   Vaping Use    Vaping status: Never Used   Substance and Sexual Activity    Alcohol use: Never    Drug use: Never    Sexual activity: Yes     Partners: Male      Family History   Problem Relation Age of Onset    Breast Cancer Paternal Grandmother         60s    Heart Surgery Maternal Grandfather     No Known Problems Father     No Known Problems Mother     Depression Brother     Anxiety Disorder Brother     No Known Problems Sister     No Known Problems Sister     Heart Defect Paternal Cousin         open heart surgery as an infant    Ovarian Cancer Neg Hx     Colon Cancer Neg Hx        Allergies   Allergen Reactions    Penicillins Hives    Gluten

## 2024-08-12 NOTE — CARE COORDINATION
SW consult received.   will meet with patient after delivery.    NAZARIO Hilario-PATY, PMH-C  [facility]   [phone]

## 2024-08-12 NOTE — PROGRESS NOTES
Pt here for decreased fetal movement at 38+ weeks, also states she had a \"gush\" last night. Fetal tachycardia noted, maternal tachycardia. Urine dipped- ketones, protein present, norma colored. States she has been throwing up. Taking macrobid for a uti, takes metformin for GDM. C/o headache and nausea. Elevated bp for her. Reactive tracing after repositioning pt to the right side.

## 2024-08-13 ENCOUNTER — ANESTHESIA EVENT (OUTPATIENT)
Dept: LABOR AND DELIVERY | Age: 24
End: 2024-08-13
Payer: COMMERCIAL

## 2024-08-13 ENCOUNTER — ANESTHESIA (OUTPATIENT)
Dept: LABOR AND DELIVERY | Age: 24
End: 2024-08-13
Payer: COMMERCIAL

## 2024-08-13 LAB
GLUCOSE BLD STRIP.AUTO-MCNC: 84 MG/DL (ref 65–100)
GLUCOSE BLD STRIP.AUTO-MCNC: 87 MG/DL (ref 65–100)
SERVICE CMNT-IMP: NORMAL
SERVICE CMNT-IMP: NORMAL

## 2024-08-13 PROCEDURE — 7100000011 HC PHASE II RECOVERY - ADDTL 15 MIN

## 2024-08-13 PROCEDURE — 6370000000 HC RX 637 (ALT 250 FOR IP): Performed by: OBSTETRICS & GYNECOLOGY

## 2024-08-13 PROCEDURE — 3E033VJ INTRODUCTION OF OTHER HORMONE INTO PERIPHERAL VEIN, PERCUTANEOUS APPROACH: ICD-10-PCS | Performed by: OBSTETRICS & GYNECOLOGY

## 2024-08-13 PROCEDURE — 1100000000 HC RM PRIVATE

## 2024-08-13 PROCEDURE — 3700000025 EPIDURAL BLOCK: Performed by: ANESTHESIOLOGY

## 2024-08-13 PROCEDURE — 2580000003 HC RX 258: Performed by: OBSTETRICS & GYNECOLOGY

## 2024-08-13 PROCEDURE — 6360000002 HC RX W HCPCS: Performed by: OBSTETRICS & GYNECOLOGY

## 2024-08-13 PROCEDURE — 00HU33Z INSERTION OF INFUSION DEVICE INTO SPINAL CANAL, PERCUTANEOUS APPROACH: ICD-10-PCS | Performed by: ANESTHESIOLOGY

## 2024-08-13 PROCEDURE — 7220000101 HC DELIVERY VAGINAL/SINGLE

## 2024-08-13 PROCEDURE — 82962 GLUCOSE BLOOD TEST: CPT

## 2024-08-13 PROCEDURE — 59400 OBSTETRICAL CARE: CPT | Performed by: OBSTETRICS & GYNECOLOGY

## 2024-08-13 PROCEDURE — 2500000003 HC RX 250 WO HCPCS: Performed by: NURSE ANESTHETIST, CERTIFIED REGISTERED

## 2024-08-13 PROCEDURE — 4A1HXCZ MONITORING OF PRODUCTS OF CONCEPTION, CARDIAC RATE, EXTERNAL APPROACH: ICD-10-PCS | Performed by: OBSTETRICS & GYNECOLOGY

## 2024-08-13 PROCEDURE — 10907ZC DRAINAGE OF AMNIOTIC FLUID, THERAPEUTIC FROM PRODUCTS OF CONCEPTION, VIA NATURAL OR ARTIFICIAL OPENING: ICD-10-PCS | Performed by: OBSTETRICS & GYNECOLOGY

## 2024-08-13 PROCEDURE — 2500000003 HC RX 250 WO HCPCS: Performed by: ANESTHESIOLOGY

## 2024-08-13 PROCEDURE — 6360000002 HC RX W HCPCS: Performed by: NURSE ANESTHETIST, CERTIFIED REGISTERED

## 2024-08-13 PROCEDURE — 7210000100 HC LABOR FEE PER 1 HR

## 2024-08-13 PROCEDURE — 0KQM0ZZ REPAIR PERINEUM MUSCLE, OPEN APPROACH: ICD-10-PCS | Performed by: OBSTETRICS & GYNECOLOGY

## 2024-08-13 PROCEDURE — 7100000010 HC PHASE II RECOVERY - FIRST 15 MIN

## 2024-08-13 RX ORDER — ONDANSETRON 2 MG/ML
4 INJECTION INTRAMUSCULAR; INTRAVENOUS EVERY 6 HOURS PRN
Status: DISCONTINUED | OUTPATIENT
Start: 2024-08-13 | End: 2024-08-13

## 2024-08-13 RX ORDER — ALBUTEROL SULFATE 2.5 MG/3ML
2.5 SOLUTION RESPIRATORY (INHALATION) EVERY 4 HOURS PRN
Status: DISCONTINUED | OUTPATIENT
Start: 2024-08-13 | End: 2024-08-15 | Stop reason: HOSPADM

## 2024-08-13 RX ORDER — LIDOCAINE HYDROCHLORIDE AND EPINEPHRINE 15; 5 MG/ML; UG/ML
INJECTION, SOLUTION EPIDURAL PRN
Status: DISCONTINUED | OUTPATIENT
Start: 2024-08-13 | End: 2024-08-13 | Stop reason: SDUPTHER

## 2024-08-13 RX ORDER — DOCUSATE SODIUM 100 MG/1
100 CAPSULE, LIQUID FILLED ORAL 2 TIMES DAILY
Status: DISCONTINUED | OUTPATIENT
Start: 2024-08-13 | End: 2024-08-15 | Stop reason: HOSPADM

## 2024-08-13 RX ORDER — OXYCODONE HYDROCHLORIDE 5 MG/1
5 TABLET ORAL EVERY 4 HOURS PRN
Status: DISCONTINUED | OUTPATIENT
Start: 2024-08-13 | End: 2024-08-15 | Stop reason: HOSPADM

## 2024-08-13 RX ORDER — HYDROXYZINE HYDROCHLORIDE 25 MG/1
25 TABLET, FILM COATED ORAL 3 TIMES DAILY PRN
Status: DISCONTINUED | OUTPATIENT
Start: 2024-08-13 | End: 2024-08-15 | Stop reason: HOSPADM

## 2024-08-13 RX ORDER — ONDANSETRON 4 MG/1
4 TABLET, ORALLY DISINTEGRATING ORAL EVERY 6 HOURS PRN
Status: DISCONTINUED | OUTPATIENT
Start: 2024-08-13 | End: 2024-08-13

## 2024-08-13 RX ORDER — MISOPROSTOL 200 UG/1
400 TABLET ORAL PRN
Status: DISCONTINUED | OUTPATIENT
Start: 2024-08-13 | End: 2024-08-13

## 2024-08-13 RX ORDER — SODIUM CHLORIDE 0.9 % (FLUSH) 0.9 %
5-40 SYRINGE (ML) INJECTION EVERY 12 HOURS SCHEDULED
Status: DISCONTINUED | OUTPATIENT
Start: 2024-08-13 | End: 2024-08-15 | Stop reason: HOSPADM

## 2024-08-13 RX ORDER — ONDANSETRON 2 MG/ML
4 INJECTION INTRAMUSCULAR; INTRAVENOUS ONCE
Status: COMPLETED | OUTPATIENT
Start: 2024-08-13 | End: 2024-08-13

## 2024-08-13 RX ORDER — ONDANSETRON 2 MG/ML
4 INJECTION INTRAMUSCULAR; INTRAVENOUS EVERY 6 HOURS PRN
Status: DISCONTINUED | OUTPATIENT
Start: 2024-08-13 | End: 2024-08-15 | Stop reason: HOSPADM

## 2024-08-13 RX ORDER — MISOPROSTOL 200 UG/1
200 TABLET ORAL PRN
Status: DISCONTINUED | OUTPATIENT
Start: 2024-08-13 | End: 2024-08-15 | Stop reason: HOSPADM

## 2024-08-13 RX ORDER — FENTANYL CITRATE 50 UG/ML
INJECTION, SOLUTION INTRAMUSCULAR; INTRAVENOUS PRN
Status: DISCONTINUED | OUTPATIENT
Start: 2024-08-13 | End: 2024-08-13 | Stop reason: SDUPTHER

## 2024-08-13 RX ORDER — SODIUM CHLORIDE 0.9 % (FLUSH) 0.9 %
5-40 SYRINGE (ML) INJECTION PRN
Status: DISCONTINUED | OUTPATIENT
Start: 2024-08-13 | End: 2024-08-15 | Stop reason: HOSPADM

## 2024-08-13 RX ORDER — ALBUTEROL SULFATE 90 UG/1
2 AEROSOL, METERED RESPIRATORY (INHALATION) EVERY 4 HOURS PRN
Status: DISCONTINUED | OUTPATIENT
Start: 2024-08-13 | End: 2024-08-13

## 2024-08-13 RX ORDER — ROPIVACAINE HYDROCHLORIDE 2 MG/ML
INJECTION, SOLUTION EPIDURAL; INFILTRATION; PERINEURAL PRN
Status: DISCONTINUED | OUTPATIENT
Start: 2024-08-13 | End: 2024-08-13 | Stop reason: SDUPTHER

## 2024-08-13 RX ORDER — SIMETHICONE 80 MG
80 TABLET,CHEWABLE ORAL EVERY 6 HOURS PRN
Status: DISCONTINUED | OUTPATIENT
Start: 2024-08-13 | End: 2024-08-15 | Stop reason: HOSPADM

## 2024-08-13 RX ORDER — METHYLERGONOVINE MALEATE 0.2 MG/ML
200 INJECTION INTRAVENOUS PRN
Status: DISCONTINUED | OUTPATIENT
Start: 2024-08-13 | End: 2024-08-15

## 2024-08-13 RX ORDER — ONDANSETRON 4 MG/1
4 TABLET, ORALLY DISINTEGRATING ORAL EVERY 6 HOURS PRN
Status: DISCONTINUED | OUTPATIENT
Start: 2024-08-13 | End: 2024-08-15 | Stop reason: HOSPADM

## 2024-08-13 RX ORDER — METHYLERGONOVINE MALEATE 0.2 MG/ML
200 INJECTION INTRAVENOUS PRN
Status: DISCONTINUED | OUTPATIENT
Start: 2024-08-13 | End: 2024-08-13

## 2024-08-13 RX ORDER — OXYCODONE HYDROCHLORIDE 5 MG/1
10 TABLET ORAL EVERY 4 HOURS PRN
Status: DISCONTINUED | OUTPATIENT
Start: 2024-08-13 | End: 2024-08-15 | Stop reason: HOSPADM

## 2024-08-13 RX ORDER — LIDOCAINE HYDROCHLORIDE 20 MG/ML
INJECTION, SOLUTION EPIDURAL; INFILTRATION; INTRACAUDAL; PERINEURAL PRN
Status: DISCONTINUED | OUTPATIENT
Start: 2024-08-13 | End: 2024-08-13 | Stop reason: SDUPTHER

## 2024-08-13 RX ORDER — FAMOTIDINE 20 MG/1
20 TABLET, FILM COATED ORAL 2 TIMES DAILY PRN
Status: DISCONTINUED | OUTPATIENT
Start: 2024-08-13 | End: 2024-08-15 | Stop reason: HOSPADM

## 2024-08-13 RX ORDER — SODIUM CHLORIDE 9 MG/ML
INJECTION, SOLUTION INTRAVENOUS PRN
Status: DISCONTINUED | OUTPATIENT
Start: 2024-08-13 | End: 2024-08-15 | Stop reason: HOSPADM

## 2024-08-13 RX ORDER — LANOLIN
CREAM (ML) TOPICAL PRN
Status: DISCONTINUED | OUTPATIENT
Start: 2024-08-13 | End: 2024-08-15 | Stop reason: HOSPADM

## 2024-08-13 RX ORDER — ACETAMINOPHEN 500 MG
1000 TABLET ORAL EVERY 8 HOURS SCHEDULED
Status: DISCONTINUED | OUTPATIENT
Start: 2024-08-13 | End: 2024-08-13

## 2024-08-13 RX ORDER — IBUPROFEN 800 MG/1
800 TABLET ORAL EVERY 8 HOURS
Status: DISCONTINUED | OUTPATIENT
Start: 2024-08-13 | End: 2024-08-15 | Stop reason: HOSPADM

## 2024-08-13 RX ORDER — DEXTROSE, SODIUM CHLORIDE, SODIUM LACTATE, POTASSIUM CHLORIDE, AND CALCIUM CHLORIDE 5; .6; .31; .03; .02 G/100ML; G/100ML; G/100ML; G/100ML; G/100ML
INJECTION, SOLUTION INTRAVENOUS CONTINUOUS
Status: DISCONTINUED | OUTPATIENT
Start: 2024-08-13 | End: 2024-08-13

## 2024-08-13 RX ADMIN — SODIUM CHLORIDE, POTASSIUM CHLORIDE, SODIUM LACTATE AND CALCIUM CHLORIDE: 600; 310; 30; 20 INJECTION, SOLUTION INTRAVENOUS at 05:52

## 2024-08-13 RX ADMIN — DOCUSATE SODIUM 100 MG: 100 CAPSULE, LIQUID FILLED ORAL at 20:59

## 2024-08-13 RX ADMIN — WATER 1000 MG: 1 INJECTION INTRAMUSCULAR; INTRAVENOUS; SUBCUTANEOUS at 01:14

## 2024-08-13 RX ADMIN — OXYCODONE 10 MG: 5 TABLET ORAL at 13:23

## 2024-08-13 RX ADMIN — METHYLERGONOVINE MALEATE 200 MCG: 0.2 INJECTION INTRAVENOUS at 11:16

## 2024-08-13 RX ADMIN — ONDANSETRON 4 MG: 2 INJECTION INTRAMUSCULAR; INTRAVENOUS at 07:07

## 2024-08-13 RX ADMIN — IBUPROFEN 800 MG: 800 TABLET, FILM COATED ORAL at 20:59

## 2024-08-13 RX ADMIN — Medication 166.7 ML: at 11:12

## 2024-08-13 RX ADMIN — IBUPROFEN 800 MG: 800 TABLET, FILM COATED ORAL at 13:07

## 2024-08-13 RX ADMIN — ACETAMINOPHEN 1000 MG: 500 TABLET, FILM COATED ORAL at 02:46

## 2024-08-13 RX ADMIN — ONDANSETRON 4 MG: 2 INJECTION INTRAMUSCULAR; INTRAVENOUS at 09:12

## 2024-08-13 RX ADMIN — WATER 1000 MG: 1 INJECTION INTRAMUSCULAR; INTRAVENOUS; SUBCUTANEOUS at 08:44

## 2024-08-13 RX ADMIN — FENTANYL CITRATE 100 MCG: 50 INJECTION, SOLUTION INTRAMUSCULAR; INTRAVENOUS at 09:28

## 2024-08-13 RX ADMIN — LIDOCAINE HYDROCHLORIDE 3 ML: 20 INJECTION, SOLUTION EPIDURAL; INFILTRATION; INTRACAUDAL; PERINEURAL at 09:28

## 2024-08-13 RX ADMIN — MORPHINE SULFATE 4 MG: 4 INJECTION INTRAVENOUS at 00:33

## 2024-08-13 RX ADMIN — ROPIVACAINE HYDROCHLORIDE 8 ML/HR: 2 INJECTION, SOLUTION EPIDURAL; INFILTRATION; PERINEURAL at 01:44

## 2024-08-13 RX ADMIN — LIDOCAINE HYDROCHLORIDE,EPINEPHRINE BITARTRATE 2 ML: 15; .005 INJECTION, SOLUTION EPIDURAL; INFILTRATION; INTRACAUDAL; PERINEURAL at 01:44

## 2024-08-13 RX ADMIN — LIDOCAINE HYDROCHLORIDE,EPINEPHRINE BITARTRATE 3 ML: 15; .005 INJECTION, SOLUTION EPIDURAL; INFILTRATION; INTRACAUDAL; PERINEURAL at 01:40

## 2024-08-13 ASSESSMENT — PAIN SCALES - GENERAL
PAINLEVEL_OUTOF10: 9
PAINLEVEL_OUTOF10: 7

## 2024-08-13 ASSESSMENT — PAIN DESCRIPTION - LOCATION: LOCATION: ABDOMEN

## 2024-08-13 ASSESSMENT — PAIN DESCRIPTION - ORIENTATION: ORIENTATION: LOWER

## 2024-08-13 ASSESSMENT — PAIN DESCRIPTION - DESCRIPTORS: DESCRIPTORS: CRAMPING

## 2024-08-13 NOTE — ANESTHESIA PROCEDURE NOTES
Epidural Block    Patient location during procedure: OB  Start time: 8/13/2024 1:30 AM  End time: 8/13/2024 1:40 AM  Reason for block: labor epidural  Staffing  Performed: anesthesiologist   Anesthesiologist: Ernie Vazquez MD  Performed by: Ernie Vazquez MD  Authorized by: Ernie Vazquez MD    Epidural  Patient position: sitting  Prep: ChloraPrep  Patient monitoring: continuous pulse ox and frequent blood pressure checks  Approach: midline  Location: L3-4  Injection technique: MAICOL saline  Provider prep: mask and sterile gloves  Needle  Needle type: Tuohy   Needle gauge: 17 G  Needle length: 3.5 in (10 cm)  Needle insertion depth: 7 cm  Catheter type: end hole  Catheter size: 19 G  Catheter at skin depth: 11 cm  Test dose: negativeCatheter Secured: tegaderm and tape (liquid adhesive)  Assessment  Hemodynamics: stable  Attempts: 1  Outcomes: patient tolerated procedure well and uncomplicated  Additional Notes  3 cc 1% lidocaine local at needle insertion site.      Discussed the risks and benefits of epidural placement and use with patient including (but not limited to) the risk of wet tap and spinal headache, inadequate analgesia, need for removal/replacement of epidural, and hypotension. I discussed the remote risk of uncontrolled bleeding or infection requiring an operation to remedy.  After the patient agreed to proceed, I ensured the patient had no contraindications to labor epidural placement including prohibitive heart defects, hypocoagulation, family or personal history of a bleeding disorder.  Epidural placement is described in the block note.  Frequent hemodynamic monitoring in the first 30 minutes following initial placement was performed.  The patient and OB RN were instructed to call anytime with any questions or concerns at anytime.     Preanesthetic Checklist  Completed: patient identified, IV checked, risks and benefits discussed, equipment checked, pre-op evaluation, timeout performed,

## 2024-08-13 NOTE — L&D DELIVERY NOTE
Clemente Girl Yancy [[id number]]      Labor Events     Labor: No   Steroids: None  Cervical Ripening Date/Time:      Antibiotics Received during Labor: Yes  Rupture Date/Time:  24 09:09:00   Rupture Type: Bulging, AROM  Fluid Color: Meconium  Meconium Consistency: Thin  Fluid Odor: None  Fluid Volume: Moderate  Induction: Misoprostol  Augmentation: AROM  Labor Complications: None       Anesthesia    Method: Epidural       Labor Event Times      Labor onset date/time:  24      Dilation complete date/time:  24      Start pushing date/time:  2024 10:03:00   Decision date/time (emergent ):            Labor Length    3rd stage: 0h 02m       Delivery Details      Delivery Date: 24 Delivery Time: 11:09:00   Delivery Type: Vaginal, Spontaneous               Presentation    Presentation: Vertex  Position: Left  _: Occiput  _: Anterior       Shoulder Dystocia    Shoulder Dystocia Present?: No       Assisted Delivery Details    Forceps Attempted?: No  Vacuum Extractor Attempted?: No                           Cord    Vessels: 3 Vessels  Complications: None  Delayed Cord Clamping?: Yes  Cord Clamped Date/Time: 2024 11:10:00  Cord Blood Disposition: Lab  Gases Sent?: No              Placenta    Date/Time: 2024 11:11:00  Removal: Spontaneous  Appearance: Intact  Disposition: Discarded       Lacerations    Episiotomy: None  Perineal Lacerations: 2nd  Other Lacerations: vaginal laceration  Vaginal Laceration?: Yes Repaired?: Yes   Number of Repair Packets: 4       Vaginal Counts    Initial Count Personnel: PITO BLUNT  Initial Count Verified By: VICKY GUERRERO  Intial Sponge Count: Correct Intial Needles Count: Correct Intial Instruments Count: Correct   Final Sponges Count: Correct Final Needles  Count: Correct Final Instruments Count: Correct   Final Count Personnel: PITO BLUNT  Final Count Verified By: VICKY GUERRERO  Accurate Final Count?: Yes

## 2024-08-13 NOTE — PLAN OF CARE
Problem: Vaginal Birth or  Section  Goal: Fetal and maternal status remain reassuring during the birth process  Description:  Birth OB-Pregnancy care plan goal which identifies if the fetal and maternal status remain reassuring during the birth process  2024 by Stacey Spencer RN  Outcome: Completed  2024 by Natalia Ye RN  Outcome: Progressing     Problem: Postpartum  Goal: Experiences normal postpartum course  Description:  Postpartum OB-Pregnancy care plan goal which identifies if the mother is experiencing a normal postpartum course  2024 by Stacey Spencer RN  Outcome: Progressing  2024 by Natalia Ye RN  Outcome: Progressing  Goal: Appropriate maternal -  bonding  Description:  Postpartum OB-Pregnancy care plan goal which identifies if the mother and  are bonding appropriately  2024 by Stacey Spencer RN  Outcome: Progressing  2024 by Natalia Ye RN  Outcome: Progressing  Goal: Establishment of infant feeding pattern  Description:  Postpartum OB-Pregnancy care plan goal which identifies if the mother is establishing a feeding pattern with their   2024 by Stacey Spencer RN  Outcome: Progressing  2024 by Natalia Ye RN  Outcome: Progressing  Goal: Incisions, wounds, or drain sites healing without S/S of infection  2024 by Stacey Spencer RN  Outcome: Progressing  2024 by Natalia Ye RN  Outcome: Progressing     Problem: Pain  Goal: Verbalizes/displays adequate comfort level or baseline comfort level  2024 by Stacey Spencer RN  Outcome: Progressing  Flowsheets (Taken 2024 1430)  Verbalizes/displays adequate comfort level or baseline comfort level:   Encourage patient to monitor pain and request assistance   Assess pain using appropriate pain scale   Implement non-pharmacological measures as appropriate and

## 2024-08-13 NOTE — ANESTHESIA PRE PROCEDURE
108.4 kg (239 lb)     Body mass index is 40.6 kg/m².    CBC:   Lab Results   Component Value Date/Time    WBC 16.4 08/12/2024 01:29 PM    RBC 4.10 08/12/2024 01:29 PM    HGB 10.4 08/12/2024 01:29 PM    HCT 32.8 08/12/2024 01:29 PM    MCV 80.0 08/12/2024 01:29 PM    RDW 14.3 08/12/2024 01:29 PM     08/12/2024 01:29 PM       CMP:   Lab Results   Component Value Date/Time     08/12/2024 01:29 PM    K 4.4 08/12/2024 01:29 PM     08/12/2024 01:29 PM    CO2 19 08/12/2024 01:29 PM    BUN 6 08/12/2024 01:29 PM    CREATININE 0.45 08/12/2024 01:29 PM    LABGLOM >90 08/12/2024 01:29 PM    LABGLOM >90 04/16/2024 08:38 AM    GLUCOSE 100 08/12/2024 01:29 PM    CALCIUM 9.0 08/12/2024 01:29 PM    BILITOT 0.3 08/12/2024 01:29 PM    ALKPHOS 158 08/12/2024 01:29 PM    AST 19 08/12/2024 01:29 PM    ALT 6 08/12/2024 01:29 PM       POC Tests:   Recent Labs     08/12/24  2230   POCGLU 116*       Coags: No results found for: \"PROTIME\", \"INR\", \"APTT\"    HCG (If Applicable): No results found for: \"PREGTESTUR\", \"PREGSERUM\", \"HCG\", \"HCGQUANT\"     ABGs: No results found for: \"PHART\", \"PO2ART\", \"LAT4WOF\", \"JHS3UHN\", \"BEART\", \"W4LHTHXY\"     Type & Screen (If Applicable):  No results found for: \"LABABO\"    Drug/Infectious Status (If Applicable):  No results found for: \"HIV\", \"HEPCAB\"    COVID-19 Screening (If Applicable):   Lab Results   Component Value Date/Time    COVID19 Performed 12/14/2021 12:00 AM    COVID19 Not Detected 12/14/2021 12:00 AM           Anesthesia Evaluation  Patient summary reviewed  Airway: Mallampati: II          Dental: normal exam         Pulmonary:Negative Pulmonary ROS breath sounds clear to auscultation                             Cardiovascular:  Exercise tolerance: good (>4 METS)  (+) hypertension:    (-) past MI, murmur and peripheral edema      Rhythm: regular  Rate: normal                 ROS comment: Holter 4/19/2024 until 4/25/2024.  Baseline rhythm is sinus.  There were no episodes of atrial

## 2024-08-13 NOTE — PROGRESS NOTES
Chart reviewed, patient seen and examined.    FHR reassuring  C/C/+1, bulging bag. AROM --> copious fluid, light mec.    Will notify SCN for delivery.

## 2024-08-14 LAB
BACTERIA SPEC CULT: ABNORMAL
BACTERIA SPEC CULT: ABNORMAL
SERVICE CMNT-IMP: ABNORMAL

## 2024-08-14 PROCEDURE — 1100000000 HC RM PRIVATE

## 2024-08-14 PROCEDURE — 6370000000 HC RX 637 (ALT 250 FOR IP): Performed by: OBSTETRICS & GYNECOLOGY

## 2024-08-14 RX ORDER — POLYETHYLENE GLYCOL 3350 17 G/17G
17 POWDER, FOR SOLUTION ORAL 4 TIMES DAILY PRN
Status: DISCONTINUED | OUTPATIENT
Start: 2024-08-14 | End: 2024-08-15 | Stop reason: HOSPADM

## 2024-08-14 RX ORDER — PRENATAL VIT/IRON FUM/FOLIC AC 27MG-0.8MG
1 TABLET ORAL DAILY
Status: DISCONTINUED | OUTPATIENT
Start: 2024-08-14 | End: 2024-08-15 | Stop reason: HOSPADM

## 2024-08-14 RX ORDER — HYDROCORTISONE ACETATE PRAMOXINE HCL 2.5; 1 G/100G; G/100G
CREAM TOPICAL 4 TIMES DAILY PRN
Status: DISCONTINUED | OUTPATIENT
Start: 2024-08-14 | End: 2024-08-15 | Stop reason: HOSPADM

## 2024-08-14 RX ORDER — HYDROCORTISONE ACETATE PRAMOXINE HCL 2.5; 1 G/100G; G/100G
CREAM TOPICAL 4 TIMES DAILY
Status: DISCONTINUED | OUTPATIENT
Start: 2024-08-14 | End: 2024-08-14

## 2024-08-14 RX ORDER — FUROSEMIDE 20 MG/1
20 TABLET ORAL DAILY
Status: DISCONTINUED | OUTPATIENT
Start: 2024-08-14 | End: 2024-08-15 | Stop reason: HOSPADM

## 2024-08-14 RX ADMIN — DOCUSATE SODIUM 100 MG: 100 CAPSULE, LIQUID FILLED ORAL at 08:24

## 2024-08-14 RX ADMIN — POLYETHYLENE GLYCOL 3350 17 G: 17 POWDER, FOR SOLUTION ORAL at 13:11

## 2024-08-14 RX ADMIN — FUROSEMIDE 20 MG: 20 TABLET ORAL at 11:59

## 2024-08-14 RX ADMIN — HYDROCORTISONE ACETATE PRAMOXINE HCL: 2.5; 1 CREAM TOPICAL at 13:11

## 2024-08-14 RX ADMIN — WITCH HAZEL: 500 SOLUTION RECTAL; TOPICAL at 08:24

## 2024-08-14 RX ADMIN — IBUPROFEN 800 MG: 800 TABLET, FILM COATED ORAL at 04:37

## 2024-08-14 RX ADMIN — DOCUSATE SODIUM 100 MG: 100 CAPSULE, LIQUID FILLED ORAL at 21:11

## 2024-08-14 RX ADMIN — PRENATAL VIT W/ FE FUMARATE-FA TAB 27-0.8 MG 1 TABLET: 27-0.8 TAB at 13:11

## 2024-08-14 RX ADMIN — IBUPROFEN 800 MG: 800 TABLET, FILM COATED ORAL at 21:11

## 2024-08-14 RX ADMIN — IBUPROFEN 800 MG: 800 TABLET, FILM COATED ORAL at 11:58

## 2024-08-14 ASSESSMENT — PAIN SCALES - GENERAL
PAINLEVEL_OUTOF10: 3
PAINLEVEL_OUTOF10: 3

## 2024-08-14 ASSESSMENT — PAIN DESCRIPTION - ORIENTATION
ORIENTATION: LOWER;INNER
ORIENTATION: LOWER;MID

## 2024-08-14 ASSESSMENT — PAIN DESCRIPTION - LOCATION
LOCATION: ABDOMEN;PERINEUM
LOCATION: PERINEUM

## 2024-08-14 ASSESSMENT — PAIN DESCRIPTION - DESCRIPTORS
DESCRIPTORS: DISCOMFORT
DESCRIPTORS: DISCOMFORT

## 2024-08-14 NOTE — CARE COORDINATION
Chart reviewed - first time parent; depression/Asperger's/schizoaffective disorder.  SW is familiar with patient due to telephonic case management during pregnancy (please see notes.)      SW met with patient to complete initial assessment.       provided education on [facility].  Family declined referral for home visit.    Due to visitor present in patient's room,  did not inquire about mental health history.  Per patient's chart, she is currently taking Vraylar and Zoloft.  These medications are managed through [facility].  Additionally, patient has been working with the same therapist since she was 16 y/o.      Patient given informational packet on  mood & anxiety disorders (resources/education).    Family denies any additional needs from  at this time.  Family has 's contact information should any needs/questions arise.    NAZARIO Hilario-PATY, PMH-C  [facility]   [phone]

## 2024-08-14 NOTE — ANESTHESIA POSTPROCEDURE EVALUATION
Department of Anesthesiology  Postprocedure Note    Patient: Yancy Cornejo  MRN: [id number]  YOB: 2000  Date of evaluation: 2024    Procedure Summary       Date: 24 Room / Location:     Anesthesia Start: 0119 Anesthesia Stop: 1109    Procedure: Labor Analgesia Diagnosis:     Scheduled Providers:  Responsible Provider: Cam Craig DO    Anesthesia Type: epidural ASA Status: 3            Anesthesia Type: No value filed.    Julianna Phase I:      Julianna Phase II:      Anesthesia Post Evaluation    Patient location during evaluation: floor  Level of consciousness: awake and alert  Airway patency: patent  Nausea & Vomiting: no nausea  Cardiovascular status: hemodynamically stable  Respiratory status: acceptable  Hydration status: euvolemic  Comments: S/p  with LO.  No issues noted with epidural.  Up and ambulating without difficulty.  No numbness/tingling noted.  Doing well and pleased with success of epidural for labor  Pain management: satisfactory to patient    No notable events documented.

## 2024-08-14 NOTE — PROGRESS NOTES
Daily Progress note:    Patient is PPD#1 S/P vaginal delivery at 38w4d . No complaints today.  Lochia < menses.  No GI/ issues.  No F/C.    VITALS  Patient Vitals for the past 24 hrs:   Temp Pulse Resp BP SpO2   24 2315 97.7 °F (36.5 °C) 90 18 131/79 98 %   24 1430 97.3 °F (36.3 °C) (!) 103 18 138/82 96 %   24 1338 98.3 °F (36.8 °C) 80 -- 136/79 --   24 1325 -- 88 -- (!) 158/99 --   24 1308 -- 78 -- (!) 153/91 --   24 1253 -- 72 -- (!) 145/95 --   24 1238 -- 92 -- (!) 143/88 --   24 1223 -- 75 -- 138/89 --   24 1208 -- 74 -- 131/87 --   24 1153 -- 100 -- 132/79 --   24 1145 -- -- 20 -- --   24 1139 -- (!) 101 -- 133/74 --   24 1123 -- (!) 111 -- 138/64 --   24 1115 -- (!) 117 -- (!) 149/67 --   24 1109 -- (!) 162 -- (!) 183/137 --   24 1039 -- (!) 110 -- 133/66 --   24 1024 -- 99 -- 128/63 --        CV - RRR  LUNGS - CTA bilaterally  ABD - soft, approp tend, FF below umbilicus  EXT - tr edema bilaterally          Labs:    No results found for this or any previous visit (from the past 24 hour(s)).        PPD #1      GHTN: BP well controlled. Lasix ordered    Anxiety, PTSD: home meds ordered    No issues or complaints today.  Stable.  Routine PP.     A POSITIVE       Maryannashley Aldrich DO  10:21 AM  24

## 2024-08-15 VITALS
BODY MASS INDEX: 40.65 KG/M2 | HEIGHT: 65 IN | TEMPERATURE: 97.7 F | OXYGEN SATURATION: 99 % | RESPIRATION RATE: 17 BRPM | DIASTOLIC BLOOD PRESSURE: 53 MMHG | SYSTOLIC BLOOD PRESSURE: 107 MMHG | WEIGHT: 244 LBS | HEART RATE: 74 BPM

## 2024-08-15 PROBLEM — O14.00 MILD PRE-ECLAMPSIA: Status: ACTIVE | Noted: 2024-08-12

## 2024-08-15 PROCEDURE — 6370000000 HC RX 637 (ALT 250 FOR IP): Performed by: OBSTETRICS & GYNECOLOGY

## 2024-08-15 RX ORDER — NITROFURANTOIN 25; 75 MG/1; MG/1
100 CAPSULE ORAL 2 TIMES DAILY
Qty: 10 CAPSULE | Refills: 0 | Status: SHIPPED | OUTPATIENT
Start: 2024-08-15 | End: 2024-08-20

## 2024-08-15 RX ORDER — FUROSEMIDE 20 MG/1
20 TABLET ORAL DAILY
Qty: 4 TABLET | Refills: 0 | Status: SHIPPED | OUTPATIENT
Start: 2024-08-15

## 2024-08-15 RX ORDER — IBUPROFEN 800 MG/1
800 TABLET ORAL
Qty: 60 TABLET | Refills: 0 | Status: SHIPPED | OUTPATIENT
Start: 2024-08-15

## 2024-08-15 RX ORDER — OXYCODONE HYDROCHLORIDE 5 MG/1
5 TABLET ORAL
Qty: 10 TABLET | Refills: 0 | Status: SHIPPED | OUTPATIENT
Start: 2024-08-15 | End: 2024-08-29

## 2024-08-15 RX ADMIN — FUROSEMIDE 20 MG: 20 TABLET ORAL at 09:04

## 2024-08-15 RX ADMIN — PRENATAL VIT W/ FE FUMARATE-FA TAB 27-0.8 MG 1 TABLET: 27-0.8 TAB at 09:04

## 2024-08-15 RX ADMIN — POLYETHYLENE GLYCOL 3350 17 G: 17 POWDER, FOR SOLUTION ORAL at 09:16

## 2024-08-15 RX ADMIN — OXYCODONE 5 MG: 5 TABLET ORAL at 06:21

## 2024-08-15 RX ADMIN — IBUPROFEN 800 MG: 800 TABLET, FILM COATED ORAL at 13:38

## 2024-08-15 RX ADMIN — DOCUSATE SODIUM 100 MG: 100 CAPSULE, LIQUID FILLED ORAL at 09:04

## 2024-08-15 RX ADMIN — IBUPROFEN 800 MG: 800 TABLET, FILM COATED ORAL at 05:15

## 2024-08-15 ASSESSMENT — PAIN SCALES - GENERAL: PAINLEVEL_OUTOF10: 5

## 2024-08-15 ASSESSMENT — PAIN DESCRIPTION - DESCRIPTORS: DESCRIPTORS: ACHING

## 2024-08-15 ASSESSMENT — PAIN DESCRIPTION - LOCATION: LOCATION: BREAST

## 2024-08-15 NOTE — PROGRESS NOTES
Shift assessment complete see flowsheet. Discussed today plan of care with pt. Bleeding precautions given. No s/s of distress noted. Questions encouraged and answered. Pt to call with needs/concerns. Pt in bed with call light in reach

## 2024-08-15 NOTE — DISCHARGE SUMMARY
Discharge Summary:     Date of Admission:  2024 11:38 AM  Date of Discharge:  8/15/2024      Patient is a 24 y.o.  at 38w4d wks who was admitted for new mild bps and HA.  HA resolved and HELLP labs wnl, but persistent mild bps and P:C ratio ruled her in for PreEclampsia w/out severe features.   She had a  and a normal PP course.   Patient remained afebrile throughout the entire hospital stay.    By ppd#2 bps normal to occasional low mild on no antihypertensives; continue Lasix x 5 days pp.  Strongly desires DC home today   -- needs to FU tomorrow in office for bp check       On day of discharge, she was discharged home in good condition, meeting all postop goals with routine postpartum instructions.       HTN diagnosis: PreEclampsia w/out Severe features     Discharge Meds:       Medication List        CONTINUE taking these medications      glucose monitoring kit  1 kit by Does not apply route daily Check blood sugars 4x daily.     Lancets Misc  1 each by Does not apply route daily Test blood sugars 4x daily.            ASK your doctor about these medications      albuterol sulfate  (90 Base) MCG/ACT inhaler  Commonly known as: Ventolin HFA  Inhale 2 puffs into the lungs every 4 hours as needed for Wheezing     blood glucose test strips  Test 4 times a day & as needed for symptoms of irregular blood glucose. Dispense sufficient amount for indicated testing frequency plus additional to accommodate PRN testing needs.     ferrous sulfate 325 (65 Fe) MG tablet  Commonly known as: IRON 325  Take 1 tablet by mouth daily     hydrOXYzine HCl 25 MG tablet  Commonly known as: ATARAX     metFORMIN 500 MG extended release tablet  Commonly known as: GLUCOPHAGE-XR  1 tablet with higher carb meals and 1 tablet at bedtime     nitrofurantoin (macrocrystal-monohydrate) 100 MG capsule  Commonly known as: MACROBID  Take 1 capsule by mouth 2 times daily for 5 days  Ask about: Should I take this medication?

## 2024-08-15 NOTE — PROGRESS NOTES
08/15/24 0830   AVS Reviewed   AVS & discharge instructions reviewed with patient and/or representative? Yes   Reviewed instructions with Patient;Other (name and relationship in comment)  (reviewed with FIOR Georges and pt mother Anna Marie)   Level of Understanding Questions answered;Verbalized understanding

## 2024-08-15 NOTE — DISCHARGE INSTRUCTIONS
After Your Delivery (the Postpartum Period): Care Instructions  Overview     After childbirth (postpartum period), your body goes through many changes as you recover. In these weeks after delivery, try to take good care of yourself. Get rest whenever you can and accept help from others.  It may take 4 to 6 weeks to feel like yourself again, and possibly longer if you had a  birth. You may feel sore or very tired as you recover. After delivery, you may continue to have contractions as the uterus returns to the size it was before your pregnancy. You will also have some vaginal bleeding. And you may have pain around the vagina as you heal. Several days after delivery you may also have pain and swelling in your breasts as they fill with milk. There are things you can do at home to help ease these discomforts.  After childbirth, it's common to feel emotional. You may feel irritable, cry easily, and feel happy one minute and sad the next. This is called the \"baby blues.\" Hormone changes are one cause of these emotional changes. These feelings usually get better within a couple of weeks. If they don't, talk to your doctor or midwife.  In the first couple of weeks after you give birth, your doctor or midwife may want to check in with you and make a plan for follow-up care. You will likely have a complete postpartum visit in the first 3 months after delivery. At that time, your doctor or midwife will check on your recovery and see how you're doing. But if you have questions or concerns before then, you can always call your doctor or midwife.  Follow-up care is a key part of your treatment and safety. Be sure to make and go to all appointments, and call your doctor if you are having problems. It's also a good idea to know your test results and keep a list of the medicines you take.  How can you care for yourself at home?  Taking care of your body  Use pads instead of tampons for bleeding. After birth, you will  You can also call the Maternal Mental Health Hotline at 1-312-ECR-MAMA (1-875.325.7552) for support. If these mood changes last more than a couple of weeks, talk to your doctor or midwife.  When should you call for help?  Share this information with your partner, family, or a friend. They can help you watch for warning signs.  Call 911  anytime you think you may need emergency care. For example, call if:    You feel you cannot stop from hurting yourself, your baby, or someone else.     You passed out (lost consciousness).     You have chest pain, are short of breath, or cough up blood.     You have a seizure.   Where to get help 24 hours a day, 7 days a week   If you or someone you know talks about suicide, self-harm, a mental health crisis, a substance use crisis, or any other kind of emotional distress, get help right away. You can:    Call the Suicide and Crisis Lifeline at 988.     Call 7-862-849-TALK (1-962.939.2643).     Text HOME to 842314 to access the Crisis Text Line.   Consider saving these numbers in your phone.  Go to [employer] for more information or to chat online.  Call your doctor or midwife now or seek immediate medical care if:    You have signs of hemorrhage (too much bleeding), such as:  Heavy vaginal bleeding. This means that you are soaking through one or more pads in an hour. Or you pass blood clots bigger than an egg.  Feeling dizzy or lightheaded, or you feel like you may faint.  Feeling so tired or weak that you cannot do your usual activities.  A fast or irregular heartbeat.  New or worse belly pain.     You have signs of infection, such as:  A fever.  Increased pain, swelling, warmth, or redness from an incision or wound.  Frequent or painful urination or blood in your urine.  Vaginal discharge that smells bad.  New or worse belly pain.     You have symptoms of a blood clot in your leg (called a deep vein thrombosis), such as:  Pain in the calf, back of the knee, thigh, or

## 2024-08-15 NOTE — PROGRESS NOTES
Progress Note                               Patient: Yancy Cornejo MRN: [id number]  SSN: [id number]    YOB: 2000  Age: 24 y.o.  Sex: female      Postpartum Day Number 2    Subjective:     Patient doing well postpartum without significant complaints. Voiding without difficulty.  Patient reports normal lochia.  Pain well controlled, voiding on her own without difficulty.       Denies HA, SOB/CP, RUQ pain, Vision changes.      Objective:     Patient Vitals for the past 12 hrs:   Temp Pulse Resp BP SpO2   08/15/24 0654 97.7 °F (36.5 °C) 74 17 (!) 107/53 99 %       Temp (24hrs), Av.2 °F (36.8 °C), Min:97.7 °F (36.5 °C), Max:98.6 °F (37 °C)      No intake or output data in the 24 hours ending 08/15/24 1507        Physical Exam:    Constitutional: She appears well-developed and well-nourished. No distress.   HENT:    Head: Normocephalic and atraumatic.   Cardiovascular: RRR  Pulmonary/Chest: CTAB  Abd:  S/NTTP/ND, BS normoactive, fundus firm at umbilicus  Ext:  No c/c; 2+ edema LE bilaterally ; DTRs 2+       Lab/Data Review:  Recent Results (from the past 72 hour(s))   POCT Glucose    Collection Time: 24 10:30 PM   Result Value Ref Range    POC Glucose 116 (H) 65 - 100 mg/dL    Performed by: Latoya    POCT Glucose    Collection Time: 24  7:33 AM   Result Value Ref Range    POC Glucose 87 65 - 100 mg/dL    Performed by: Joyce    POCT Glucose    Collection Time: 24  9:33 AM   Result Value Ref Range    POC Glucose 84 65 - 100 mg/dL    Performed by: Joyce          Information for the patient's :  Clemente, Girl Yancy [[id number]]     Lab Results   Component Value Date/Time    ABORH A POSITIVE 2024 11:09 AM          Assessment and Plan:     24 y.o.  ppd# 2 s/p  at 38w4d after IOL for PreEclampsia w/out Severe Ftrs:     1) PP:  Meeting all pp goals, continue routine care;   appropriate for DC home today per pt request but needs bp check in office  tomorrow and again in 1wk given PreE status.    2) Formual feeding, Rh pos , Rub imm    3) PreEclampsia w/out Severe Ftrs:  HELLP labs wnl, P:C ratio 0.3 --ruling her in for PreE.  Bps normal to occasional mild on no antihypertensives; continue Lasix x 5 days pp.  Strongly desires DC home today   -- FU tomorrow in office for bp check     4) Depression/Anx/PTSD/Asperger's/schizoaffective:  see prior notes.  Stable on Vraylar and Zoloft; followed by [facility].  Has been working with the same therapist since she was 16 y/o.        Signed By: Kristine Evans MD     August 15, 2024

## 2024-08-15 NOTE — PROGRESS NOTES
Patient discharged to home per MD orders.  Discharge instructions reviewed with patient and pt given a copy. Pt, FOB and grandmother (pt mother) aware that pt needs to go for BP check tomorrow. Questions encouraged and answered. Patient verbalizes understanding. Patient escorted by [facility] staff to private vehicle via w/c. Stable at discharge.

## 2024-08-15 NOTE — PROGRESS NOTES
Dr. Evans at nurses station, per MD pt needs to be seen tomorrow for BP check. MD Read back and verified.  Pt notified of the above and voiced understanding.

## 2024-08-16 ENCOUNTER — HOSPITAL ENCOUNTER (EMERGENCY)
Age: 24
Discharge: HOME | End: 2024-08-16
Attending: EMERGENCY MEDICINE
Payer: COMMERCIAL

## 2024-08-16 ENCOUNTER — APPOINTMENT (OUTPATIENT)
Dept: GENERAL RADIOLOGY | Age: 24
End: 2024-08-16
Payer: COMMERCIAL

## 2024-08-16 VITALS
TEMPERATURE: 98.2 F | HEART RATE: 85 BPM | OXYGEN SATURATION: 97 % | DIASTOLIC BLOOD PRESSURE: 80 MMHG | BODY MASS INDEX: 40.65 KG/M2 | RESPIRATION RATE: 18 BRPM | WEIGHT: 244 LBS | SYSTOLIC BLOOD PRESSURE: 119 MMHG | HEIGHT: 65 IN

## 2024-08-16 DIAGNOSIS — R03.0 ELEVATED BLOOD PRESSURE READING: Primary | ICD-10-CM

## 2024-08-16 LAB
ALBUMIN SERPL-MCNC: 3 G/DL (ref 3.5–5)
ALBUMIN/GLOB SERPL: 1 (ref 0.4–1.6)
ALP SERPL-CCNC: 117 U/L (ref 45–117)
ALT SERPL-CCNC: 8 U/L (ref 13–61)
ANION GAP SERPL CALC-SCNC: 17 MMOL/L (ref 9–18)
AST SERPL-CCNC: 25 U/L (ref 15–37)
BASOPHILS # BLD: 0 K/UL (ref 0–0.2)
BASOPHILS NFR BLD: 0 % (ref 0–2)
BILIRUB SERPL-MCNC: <0.2 MG/DL (ref 0.2–1.1)
BUN SERPL-MCNC: 12 MG/DL (ref 6–23)
CALCIUM SERPL-MCNC: 8.5 MG/DL (ref 8.3–10.4)
CHLORIDE SERPL-SCNC: 106 MMOL/L (ref 98–107)
CO2 SERPL-SCNC: 18 MMOL/L (ref 21–32)
CREAT SERPL-MCNC: 0.47 MG/DL (ref 0.6–1)
DIFFERENTIAL METHOD BLD: ABNORMAL
EOSINOPHIL # BLD: 0.3 K/UL (ref 0–0.8)
EOSINOPHIL NFR BLD: 3 % (ref 0.5–7.8)
ERYTHROCYTE [DISTWIDTH] IN BLOOD BY AUTOMATED COUNT: 14.6 % (ref 11.9–14.6)
GLOBULIN SER CALC-MCNC: 3.1 G/DL (ref 2.8–4.5)
GLUCOSE SERPL-MCNC: 95 MG/DL (ref 65–100)
HCT VFR BLD AUTO: 26.5 % (ref 35.8–46.3)
HGB BLD-MCNC: 8.5 G/DL (ref 11.7–15.4)
IMM GRANULOCYTES # BLD AUTO: 0.1 K/UL (ref 0–0.5)
IMM GRANULOCYTES NFR BLD AUTO: 1 % (ref 0–5)
LYMPHOCYTES # BLD: 1.9 K/UL (ref 0.5–4.6)
LYMPHOCYTES NFR BLD: 19 % (ref 13–44)
MCH RBC QN AUTO: 25.8 PG (ref 26.1–32.9)
MCHC RBC AUTO-ENTMCNC: 32.1 G/DL (ref 31.4–35)
MCV RBC AUTO: 80.5 FL (ref 82–102)
MONOCYTES # BLD: 0.6 K/UL (ref 0.1–1.3)
MONOCYTES NFR BLD: 6 % (ref 4–12)
NEUTS SEG # BLD: 6.9 K/UL (ref 1.7–8.2)
NEUTS SEG NFR BLD: 70 % (ref 43–78)
NRBC # BLD: 0 K/UL (ref 0–0.2)
PLATELET # BLD AUTO: 325 K/UL (ref 150–450)
PMV BLD AUTO: 9.6 FL (ref 9.4–12.3)
POTASSIUM SERPL-SCNC: 3.9 MMOL/L (ref 3.5–5.1)
PROT SERPL-MCNC: 6.1 G/DL (ref 6.4–8.2)
RBC # BLD AUTO: 3.29 M/UL (ref 4.05–5.2)
SODIUM SERPL-SCNC: 141 MMOL/L (ref 133–143)
TROPONIN T SERPL HS-MCNC: <6 NG/L (ref 0–14)
WBC # BLD AUTO: 9.8 K/UL (ref 4.3–11.1)

## 2024-08-16 PROCEDURE — 93005 ELECTROCARDIOGRAM TRACING: CPT | Performed by: EMERGENCY MEDICINE

## 2024-08-16 PROCEDURE — 71046 X-RAY EXAM CHEST 2 VIEWS: CPT | Performed by: RADIOLOGY

## 2024-08-16 PROCEDURE — 71046 X-RAY EXAM CHEST 2 VIEWS: CPT

## 2024-08-16 PROCEDURE — 84484 ASSAY OF TROPONIN QUANT: CPT

## 2024-08-16 PROCEDURE — 80053 COMPREHEN METABOLIC PANEL: CPT

## 2024-08-16 PROCEDURE — 99285 EMERGENCY DEPT VISIT HI MDM: CPT

## 2024-08-16 PROCEDURE — 85025 COMPLETE CBC W/AUTO DIFF WBC: CPT

## 2024-08-16 ASSESSMENT — PAIN - FUNCTIONAL ASSESSMENT: PAIN_FUNCTIONAL_ASSESSMENT: 0-10

## 2024-08-16 ASSESSMENT — LIFESTYLE VARIABLES
HOW MANY STANDARD DRINKS CONTAINING ALCOHOL DO YOU HAVE ON A TYPICAL DAY: PATIENT DOES NOT DRINK
HOW OFTEN DO YOU HAVE A DRINK CONTAINING ALCOHOL: NEVER

## 2024-08-16 ASSESSMENT — PAIN SCALES - GENERAL: PAINLEVEL_OUTOF10: 4

## 2024-08-16 NOTE — ED TRIAGE NOTES
Pt to ED with c/o hypertension, headache, and intermittent left sided chest tightness. Pt states had a baby 3 days ago. Pt state did not know her BP was elevated until the day they were discharging her. Mother states they said to go home but to watch it. Pt states sxs started today. Pt alert ambulatory and in no acute distress at this time.

## 2024-08-17 LAB
EKG ATRIAL RATE: 83 BPM
EKG DIAGNOSIS: NORMAL
EKG P AXIS: 44 DEGREES
EKG P-R INTERVAL: 142 MS
EKG Q-T INTERVAL: 348 MS
EKG QRS DURATION: 84 MS
EKG QTC CALCULATION (BAZETT): 408 MS
EKG R AXIS: 89 DEGREES
EKG T AXIS: 68 DEGREES
EKG VENTRICULAR RATE: 83 BPM

## 2024-08-17 PROCEDURE — 93010 ELECTROCARDIOGRAM REPORT: CPT | Performed by: INTERNAL MEDICINE

## 2024-08-17 NOTE — ED PROVIDER NOTES
Emergency Department Provider Note       PCP: None, None   Age: 24 y.o.   Sex: female     DISPOSITION Decision To Discharge 2024 09:43:35 PM  Condition at Disposition: Stable       ICD-10-CM    1. Elevated blood pressure reading  R03.0           Medical Decision Making     Blood pressures in the emergency ferment have all been within normal limits.  No clinically significant abnormalities noted on testing.  And chest x-ray is negative.  Case discussed with Dr. Rogers.  Instructions to follow-up as scheduled.     1 acute complicated illness or injury.  Shared medical decision making was utilized in creating the patients health plan today.    I independently ordered and reviewed each unique test.       I interpreted the EKG performed at 1937 shows a normal sinus rhythm, rate of 83, normal EKG.              History     Patient presents to the ER for evaluation of elevated blood pressure with recent delivery of a term pregnancy.  She was discharged from the hospital and had some mildly elevated blood pressure readings prior to discharge but at discharge her blood pressures were normal.  She was instructed to monitor her blood pressure and contact office if any issues develop.  Today she began having a headache and some mild sharp intermittent chest pain and took her blood pressure noted to be 150 systolic.  She subsequent contacted the office and then was came to the emergency department for evaluation.  She states since that time the headache has improved but has had no treatment.  She denies any fever or chills.  In review of systems is otherwise negative.  She did not have any preeclampsia or eclampsia during the pregnancy and is currently on no blood pressure treatment.  She has a follow-up appointment already scheduled for 3 days in the office.  She is  with vaginal delivery of a term female infant without complication.    The history is provided by the patient and medical records.       YAN  Year: Never true   Transportation Needs: No Transportation Needs (8/12/2024)    PRAPARE - Transportation     Lack of Transportation (Medical): No     Lack of Transportation (Non-Medical): No   Physical Activity: Insufficiently Active (12/19/2023)    Received from [facility]    Physical Activity     Days of Exercise per Week: 2     Minutes of Exercise per Session: 30     Total Minutes of Exercise per Week: 60   Stress: Stress Concern Present (12/19/2023)    Received from [facility]    Stress     Feeling of Stress : Quite a bit   Social Connections: Moderately Integrated (12/19/2023)    Received from [facility]    Social Connections     Frequency of Communication with Friends and Family: More than three times a week     Frequency of Social Gatherings with Friends and Family: Once a week   Intimate Partner Violence: Unknown (3/6/2024)    Received from [facility]    Intimate Partner Violence     Fear of Current or Ex-Partner: Not asked     Emotionally Abused: Not asked     Physically Abused: Not asked     Sexually Abused: Not asked   Housing Stability: Low Risk  (8/12/2024)    Housing Stability Vital Sign     Unable to Pay for Housing in the Last Year: No     Number of Times Moved in the Last Year: 1     Homeless in the Last Year: No        Previous Medications    ALBUTEROL SULFATE HFA (VENTOLIN HFA) 108 (90 BASE) MCG/ACT INHALER    Inhale 2 puffs into the lungs every 4 hours as needed for Wheezing    FERROUS SULFATE (IRON 325) 325 (65 FE) MG TABLET    Take 1 tablet by mouth daily    FUROSEMIDE (LASIX) 20 MG TABLET    Take 1 tablet by mouth daily    GLUCOSE MONITORING KIT    1 kit by Does not apply route daily Check blood sugars 4x daily.    HYDROXYZINE HCL (ATARAX) 25 MG TABLET    Take 1 tablet by mouth 3 times daily as needed    IBUPROFEN (ADVIL;MOTRIN) 800 MG TABLET    Take 1 tablet by mouth every 6-8 hours as needed for Pain    LANCETS MISC    1

## 2024-08-19 ENCOUNTER — NURSE ONLY (OUTPATIENT)
Dept: OBGYN CLINIC | Age: 24
End: 2024-08-19

## 2024-08-19 VITALS — SYSTOLIC BLOOD PRESSURE: 132 MMHG | DIASTOLIC BLOOD PRESSURE: 76 MMHG

## 2024-08-19 NOTE — PROGRESS NOTES
Pt presents for BP check at 6 days postpartum. Reports some headache this morning, improved some with ibuprofen. +1 pedal edema. Denies RUQ pain. Advised to return for BP check X 1 wk, and needs 6 wk PP visit scheduled.

## 2024-08-26 ENCOUNTER — POSTPARTUM VISIT (OUTPATIENT)
Dept: OBGYN CLINIC | Age: 24
End: 2024-08-26

## 2024-08-26 VITALS — DIASTOLIC BLOOD PRESSURE: 86 MMHG | SYSTOLIC BLOOD PRESSURE: 138 MMHG

## 2024-08-26 NOTE — PROGRESS NOTES
Pt came in for BP check. Said BP has been going up and down.     Her BP was normal today per Dr. Aldrich.    I advised her to keep a check on BP and follow up if having anymore trouble

## 2024-08-31 DIAGNOSIS — O99.013 ANEMIA AFFECTING PREGNANCY IN THIRD TRIMESTER: ICD-10-CM

## 2024-09-03 RX ORDER — FERROUS SULFATE 325(65) MG
1 TABLET ORAL DAILY
Qty: 30 TABLET | Refills: 1 | OUTPATIENT
Start: 2024-09-03

## 2024-09-23 NOTE — PROGRESS NOTES
6 week Postpartum Office Visit     Yancy Cornejo is a 24 y.o.  who is here for her 6 week postpartum visit. She is here today with her mother.     Date of Delivery: 24  at 38w4d after IOL due to GHTN. She did sustain a 2nd degree laceration which was repaired at delivery.     Feeding: She states she has been formula feeding which has going well.   She denies any breast complaints (erythema, lesions, rashes, abscess, etc) today.      Birth Control: Would like Nexplanon implant     Bleeding: Bleeding from delivery ceased after a few weeks however, she reports brown discharge which stopped a few days ago.      Baby: Baby girl doing well and is here during visit today.      Bowel/Bladder: States she started experiencing \"yellowish\" vaginal discharge since delivery. She denies vaginal odor however, is concerned for possible vaginal infection. She informs me that she did have intercourse last weekend.   She also reports having a hemorrhoid. Denies increase in straining. States last bowel movement was \"yesterday or the day before.\"  Encouraged use of Tucks pads, Colace, increase in fiber and water intake to assist.      Reports good mood overall however, states did wean off Vraylar. States last time took medication was 2 weekends ago. She states \"I passed out 4 times during that day and I don't know if that has to go with me coming off the medication.\" States she has upcoming cardiology appointment in December however, encouraged her to contact cardiology to inform them of above symptoms to see if they would want her to be seen sooner. She is understanding of this.   Denies an increase in depression, SI or HI.   States continues to see psychiatry and attends therapy.      Last pap: 2023-Neg cytology      Review of Systems     Constitutional:  No fevers or chills  CV: No chest pain or palpitations  Resp: No SOB or cough  GI:  No nausea/vomiting/diarrhea/constipation. Positive for hemorrhoids.  Neuro: No

## 2024-09-24 ENCOUNTER — POSTPARTUM VISIT (OUTPATIENT)
Dept: OBGYN CLINIC | Age: 24
End: 2024-09-24

## 2024-09-24 VITALS
DIASTOLIC BLOOD PRESSURE: 80 MMHG | BODY MASS INDEX: 38.49 KG/M2 | HEIGHT: 65 IN | SYSTOLIC BLOOD PRESSURE: 116 MMHG | WEIGHT: 231 LBS

## 2024-09-24 DIAGNOSIS — Z30.09 ENCOUNTER FOR COUNSELING REGARDING CONTRACEPTION: ICD-10-CM

## 2024-09-24 DIAGNOSIS — N89.8 VAGINAL DISCHARGE: ICD-10-CM

## 2024-09-24 DIAGNOSIS — K64.9 HEMORRHOIDS, UNSPECIFIED HEMORRHOID TYPE: ICD-10-CM

## 2024-09-24 PROCEDURE — 0503F POSTPARTUM CARE VISIT: CPT | Performed by: NURSE PRACTITIONER

## 2024-09-24 RX ORDER — DULOXETIN HYDROCHLORIDE 30 MG/1
CAPSULE, DELAYED RELEASE ORAL
COMMUNITY
Start: 2024-09-05

## 2024-09-24 RX ORDER — PRAZOSIN HYDROCHLORIDE 1 MG/1
CAPSULE ORAL
COMMUNITY
Start: 2024-09-12

## 2024-09-24 RX ORDER — ARIPIPRAZOLE 10 MG/1
10 TABLET ORAL DAILY
COMMUNITY
Start: 2024-09-12

## 2024-09-27 LAB
A VAGINAE DNA VAG QL NAA+PROBE: NORMAL SCORE
BVAB2 DNA VAG QL NAA+PROBE: NORMAL SCORE
C ALBICANS DNA VAG QL NAA+PROBE: NEGATIVE
C GLABRATA DNA VAG QL NAA+PROBE: NEGATIVE
MEGA1 DNA VAG QL NAA+PROBE: NORMAL SCORE
SPECIMEN SOURCE: NORMAL
T VAGINALIS RRNA SPEC QL NAA+PROBE: NEGATIVE

## 2024-10-11 ENCOUNTER — TELEPHONE (OUTPATIENT)
Dept: OBGYN CLINIC | Age: 24
End: 2024-10-11

## 2024-10-11 NOTE — TELEPHONE ENCOUNTER
Pt LVM stating she has started her first PP menses, and is soaking through a pad in < 1 hr, having heavy cramping.    LVM for pt to c/b to discuss.

## 2024-11-07 ENCOUNTER — OFFICE VISIT (OUTPATIENT)
Dept: OBGYN CLINIC | Age: 24
End: 2024-11-07

## 2024-11-07 VITALS — BODY MASS INDEX: 38.44 KG/M2 | WEIGHT: 231 LBS | SYSTOLIC BLOOD PRESSURE: 128 MMHG | DIASTOLIC BLOOD PRESSURE: 78 MMHG

## 2024-11-07 DIAGNOSIS — Z30.017 NEXPLANON INSERTION: Primary | ICD-10-CM

## 2024-11-07 ASSESSMENT — ENCOUNTER SYMPTOMS
EYES NEGATIVE: 1
GASTROINTESTINAL NEGATIVE: 1
RESPIRATORY NEGATIVE: 1
ALLERGIC/IMMUNOLOGIC NEGATIVE: 1

## 2024-11-07 NOTE — PROGRESS NOTES
Name: Yancy Cornejo  Date: 2024  YOB: 2000  LMP: No LMP recorded.      Yancy is a 24 y.o.   who is here for a problem visit for nexplanon placement .     Yancy  reports being sexually active and has had partner(s) who are male.  Contraception: none.   Menstrual status: regular cycles  Gyn Surgery: none     Women's Health Timeline:  No specialty comments available.      Pap History:  No results found for: \"DIAG\", \"[id number]\", \"HPVGENOREFL\"     OB History:  OB History          1    Para   1    Term   1            AB        Living   1         SAB        IAB        Ectopic        Molar        Multiple   0    Live Births   1                 Medical History:  Past Medical History:  2008: Asperger's disorder  2008: Asthma  No date: B12 deficiency  2008: Depression  2008: History of panic attacks  2024: Mild pre-eclampsia  No date: PTSD (post-traumatic stress disorder)  No date: Schizoaffective disorder (HCC)     Surgical History:  Past Surgical History:  2008: TONSILLECTOMY AND ADENOIDECTOMY; Bilateral  2018: WISDOM TOOTH EXTRACTION     Meds:    Current Outpatient Medications:     ARIPiprazole (ABILIFY) 10 MG tablet, Take 1 tablet by mouth daily, Disp: , Rfl:     DULoxetine (CYMBALTA) 30 MG extended release capsule, PLEASE SEE ATTACHED FOR DETAILED DIRECTIONS, Disp: , Rfl:     prazosin (MINIPRESS) 1 MG capsule, TAKE 1 CAPSULE BY MOUTH EVERYDAY AT BEDTIME, Disp: , Rfl:     ferrous sulfate (IRON 325) 325 (65 Fe) MG tablet, Take 1 tablet by mouth daily (Patient not taking: Reported on 2024), Disp: 30 tablet, Rfl: 1    albuterol sulfate HFA (VENTOLIN HFA) 108 (90 Base) MCG/ACT inhaler, Inhale 2 puffs into the lungs every 4 hours as needed for Wheezing, Disp: 18 g, Rfl: 0    hydrOXYzine HCl (ATARAX) 25 MG tablet, Take 1 tablet by mouth 3 times daily as needed, Disp: , Rfl:     Prenatal Vit-Fe Fumarate-FA (PRENATAL PO), Take by mouth, Disp: , Rfl:     Family Cancer History:

## 2024-11-18 ENCOUNTER — HOSPITAL ENCOUNTER (EMERGENCY)
Age: 24
Discharge: HOME | End: 2024-11-19
Attending: EMERGENCY MEDICINE
Payer: COMMERCIAL

## 2024-11-18 ENCOUNTER — TELEPHONE (OUTPATIENT)
Dept: OBGYN CLINIC | Age: 24
End: 2024-11-18

## 2024-11-18 DIAGNOSIS — R10.13 EPIGASTRIC PAIN: Primary | ICD-10-CM

## 2024-11-18 LAB
ALBUMIN SERPL-MCNC: 4.4 G/DL (ref 3.5–5)
ALBUMIN/GLOB SERPL: 1.4 (ref 1–1.9)
ALP SERPL-CCNC: 88 U/L (ref 35–104)
ALT SERPL-CCNC: 18 U/L (ref 12–65)
ANION GAP SERPL CALC-SCNC: 11 MMOL/L (ref 7–16)
APPEARANCE UR: CLEAR
AST SERPL-CCNC: 32 U/L (ref 15–37)
BACTERIA URNS QL MICRO: NORMAL /HPF
BASOPHILS # BLD: 0.1 K/UL (ref 0–0.2)
BASOPHILS NFR BLD: 1 % (ref 0–2)
BILIRUB SERPL-MCNC: 0.2 MG/DL (ref 0–1.2)
BILIRUB UR QL: NEGATIVE
BUN SERPL-MCNC: 12 MG/DL (ref 6–23)
CALCIUM SERPL-MCNC: 9.7 MG/DL (ref 8.8–10.2)
CHLORIDE SERPL-SCNC: 106 MMOL/L (ref 98–107)
CO2 SERPL-SCNC: 23 MMOL/L (ref 20–29)
COLOR UR: YELLOW
CREAT SERPL-MCNC: 0.65 MG/DL (ref 0.8–1.3)
DIFFERENTIAL METHOD BLD: ABNORMAL
EOSINOPHIL # BLD: 0.2 K/UL (ref 0–0.8)
EOSINOPHIL NFR BLD: 2 % (ref 0.5–7.8)
EPI CELLS #/AREA URNS HPF: NORMAL /HPF
ERYTHROCYTE [DISTWIDTH] IN BLOOD BY AUTOMATED COUNT: 16.7 % (ref 11.9–14.6)
GLOBULIN SER CALC-MCNC: 3.2 G/DL (ref 2.3–3.5)
GLUCOSE SERPL-MCNC: 84 MG/DL (ref 65–100)
GLUCOSE UR STRIP.AUTO-MCNC: NEGATIVE MG/DL
HCG UR QL: NEGATIVE
HCT VFR BLD AUTO: 36.8 % (ref 35.8–46.3)
HGB BLD-MCNC: 12.2 G/DL (ref 11.7–15.4)
HGB UR QL STRIP: ABNORMAL
IMM GRANULOCYTES # BLD AUTO: 0 K/UL (ref 0–0.5)
IMM GRANULOCYTES NFR BLD AUTO: 0 % (ref 0–5)
KETONES UR QL STRIP.AUTO: NEGATIVE MG/DL
LEUKOCYTE ESTERASE UR QL STRIP.AUTO: ABNORMAL
LIPASE SERPL-CCNC: 25 U/L (ref 13–60)
LYMPHOCYTES # BLD: 2.8 K/UL (ref 0.5–4.6)
LYMPHOCYTES NFR BLD: 39 % (ref 13–44)
MCH RBC QN AUTO: 25.3 PG (ref 26.1–32.9)
MCHC RBC AUTO-ENTMCNC: 33.2 G/DL (ref 31.4–35)
MCV RBC AUTO: 76.2 FL (ref 82–102)
MONOCYTES # BLD: 0.6 K/UL (ref 0.1–1.3)
MONOCYTES NFR BLD: 8 % (ref 4–12)
MUCOUS THREADS URNS QL MICRO: 0 /LPF
NEUTS SEG # BLD: 3.7 K/UL (ref 1.7–8.2)
NEUTS SEG NFR BLD: 50 % (ref 43–78)
NITRITE UR QL STRIP.AUTO: NEGATIVE
NRBC # BLD: 0 K/UL (ref 0–0.2)
OTHER OBSERVATIONS: NORMAL
PH UR STRIP: 7 (ref 5–9)
PLATELET # BLD AUTO: 489 K/UL (ref 150–450)
PMV BLD AUTO: 9.6 FL (ref 9.4–12.3)
POTASSIUM SERPL-SCNC: 4.6 MMOL/L (ref 3.5–5.1)
PROT SERPL-MCNC: 7.6 G/DL (ref 6.3–8.2)
PROT UR STRIP-MCNC: NEGATIVE MG/DL
RBC # BLD AUTO: 4.83 M/UL (ref 4.05–5.2)
RBC #/AREA URNS HPF: NORMAL /HPF
SODIUM SERPL-SCNC: 140 MMOL/L (ref 133–143)
SP GR UR REFRACTOMETRY: 1.01 (ref 1–1.02)
UROBILINOGEN UR QL STRIP.AUTO: 0.2 EU/DL (ref 0.2–1)
WBC # BLD AUTO: 7.3 K/UL (ref 4.3–11.1)
WBC URNS QL MICRO: NORMAL /HPF

## 2024-11-18 PROCEDURE — 85025 COMPLETE CBC W/AUTO DIFF WBC: CPT

## 2024-11-18 PROCEDURE — 99283 EMERGENCY DEPT VISIT LOW MDM: CPT

## 2024-11-18 PROCEDURE — 81001 URINALYSIS AUTO W/SCOPE: CPT

## 2024-11-18 PROCEDURE — 83690 ASSAY OF LIPASE: CPT

## 2024-11-18 PROCEDURE — 80053 COMPREHEN METABOLIC PANEL: CPT

## 2024-11-18 PROCEDURE — 81025 URINE PREGNANCY TEST: CPT

## 2024-11-18 ASSESSMENT — PAIN DESCRIPTION - LOCATION: LOCATION: ABDOMEN;HEAD

## 2024-11-18 ASSESSMENT — LIFESTYLE VARIABLES
HOW OFTEN DO YOU HAVE A DRINK CONTAINING ALCOHOL: NEVER
HOW MANY STANDARD DRINKS CONTAINING ALCOHOL DO YOU HAVE ON A TYPICAL DAY: PATIENT DOES NOT DRINK

## 2024-11-18 ASSESSMENT — PAIN - FUNCTIONAL ASSESSMENT: PAIN_FUNCTIONAL_ASSESSMENT: 0-10

## 2024-11-18 ASSESSMENT — PAIN SCALES - GENERAL: PAINLEVEL_OUTOF10: 4

## 2024-11-18 ASSESSMENT — PAIN DESCRIPTION - ORIENTATION: ORIENTATION: UPPER;RIGHT

## 2024-11-18 ASSESSMENT — PAIN DESCRIPTION - DESCRIPTORS: DESCRIPTORS: SQUEEZING;STABBING

## 2024-11-18 NOTE — TELEPHONE ENCOUNTER
Patient called stating that she had some vaginal cysts that she thinks have burst. Patient reports vaginal bleeding overnight, fever, and vomiting. Symptoms have resolved this morning, but she continues to have vaginal pain. Patient states that she has had 5/10-8/10 vaginal pain since giving birth in August. Advised to alternate ibu and tylenol for pain relief. Office visit scheduled.

## 2024-11-19 ENCOUNTER — HOSPITAL ENCOUNTER (OUTPATIENT)
Dept: ULTRASOUND IMAGING | Age: 24
Discharge: HOME | End: 2024-11-21
Payer: COMMERCIAL

## 2024-11-19 VITALS
RESPIRATION RATE: 17 BRPM | SYSTOLIC BLOOD PRESSURE: 124 MMHG | DIASTOLIC BLOOD PRESSURE: 77 MMHG | WEIGHT: 238 LBS | BODY MASS INDEX: 39.65 KG/M2 | TEMPERATURE: 98.4 F | HEIGHT: 65 IN | HEART RATE: 61 BPM | OXYGEN SATURATION: 100 %

## 2024-11-19 PROCEDURE — 76705 ECHO EXAM OF ABDOMEN: CPT

## 2024-11-19 RX ORDER — HYDROCODONE BITARTRATE AND ACETAMINOPHEN 5; 325 MG/1; MG/1
1 TABLET ORAL EVERY 6 HOURS PRN
Qty: 10 TABLET | Refills: 0 | Status: SHIPPED | OUTPATIENT
Start: 2024-11-19 | End: 2024-11-24

## 2024-11-19 RX ORDER — PROMETHAZINE HYDROCHLORIDE 25 MG/1
25 TABLET ORAL EVERY 6 HOURS PRN
Qty: 20 TABLET | Refills: 2 | Status: SHIPPED | OUTPATIENT
Start: 2024-11-19 | End: 2024-11-26

## 2024-11-19 ASSESSMENT — ENCOUNTER SYMPTOMS
DIARRHEA: 1
VOMITING: 1

## 2024-11-19 NOTE — ED TRIAGE NOTES
Patient presents ambulatory to triage in no apparent distress with concerns for high blood pressure at home with associated headache with vision changes and RUQ abdominal pain. Onset yesterday. Pt is 3 months postpartum and was diagnosed with postpartum pre-e shortly after delivery. BP was 152/104 at home

## 2024-11-19 NOTE — DISCHARGE INSTRUCTIONS
Follow-up later today for your outpatient right upper quadrant ultrasound.  Follow-up with your OB/GYN after that.  Return to the emergency department if your symptoms worsen despite the prescribed medicines.

## 2024-11-19 NOTE — ED PROVIDER NOTES
Emergency Department Provider Note       PCP: Kevin Valerio MD   Age: 24 y.o.   Sex: female     DISPOSITION Decision To Discharge 11/19/2024 01:35:33 AM    ICD-10-CM    1. Epigastric pain  R10.13 HYDROcodone-acetaminophen (NORCO) 5-325 MG per tablet          Medical Decision Making     Patient is a 24-year-old female with a history of anxiety, gestational diabetes who delivered a baby 3 months ago who presents with right sided upper abdominal pain.  She states it started yesterday along with vomiting and diarrhea.  She also has had some dysuria and hematuria.  She also has had a headache and elevated blood pressure.  She tried drinking fluids today but vomited afterwards.  The pain got particularly bad tonight thus she came here for evaluation.  She denies similar pain in the past, denies any prior abdominal surgeries.    Differential diagnosis: Biliary colic, pyelonephritis, renal colic, pancreatitis    Labs are completely normal, no sign of infection or biliary disease.  Discussed results with patient and mother, she appears comfortable and in no distress.  She does have risk factors for gallstones namely her weight and her recent child delivery.  Outpatient ultrasound will be ordered.  She has an OB/GYN she can follow-up with.     1 acute, uncomplicated illness or injury.  Prescription drug management performed.  I independently ordered and reviewed each unique test.                         History     Patient is a 24-year-old female with a history of anxiety, gestational diabetes who delivered a baby 3 months ago who presents with right sided upper abdominal pain.  She states it started yesterday along with vomiting and diarrhea.  She also has had some dysuria and hematuria.  She also has had a headache and elevated blood pressure.  She tried drinking fluids today but vomited afterwards.  The pain got particularly bad tonight thus she came here for evaluation.  She denies similar pain in the past, denies  Hematocrit 36.8 35.8 - 46.3 %    MCV 76.2 (L) 82.0 - 102.0 FL    MCH 25.3 (L) 26.1 - 32.9 PG    MCHC 33.2 31.4 - 35.0 g/dL    RDW 16.7 (H) 11.9 - 14.6 %    Platelets 489 (H) 150 - 450 K/uL    MPV 9.6 9.4 - 12.3 FL    nRBC 0.00 0.0 - 0.2 K/uL    Differential Type AUTOMATED      Neutrophils % 50 43 - 78 %    Lymphocytes % 39 13 - 44 %    Monocytes % 8 4.0 - 12.0 %    Eosinophils % 2 0.5 - 7.8 %    Basophils % 1 0.0 - 2.0 %    Immature Granulocytes % 0 0.0 - 5.0 %    Neutrophils Absolute 3.7 1.7 - 8.2 K/UL    Lymphocytes Absolute 2.8 0.5 - 4.6 K/UL    Monocytes Absolute 0.6 0.1 - 1.3 K/UL    Eosinophils Absolute 0.2 0.0 - 0.8 K/UL    Basophils Absolute 0.1 0.0 - 0.2 K/UL    Immature Granulocytes Absolute 0.0 0.0 - 0.5 K/UL   CMP   Result Value Ref Range    Sodium 140 133 - 143 mmol/L    Potassium 4.6 3.5 - 5.1 mmol/L    Chloride 106 98 - 107 mmol/L    CO2 23 20 - 29 mmol/L    Anion Gap 11 7 - 16 mmol/L    Glucose 84 65 - 100 mg/dL    BUN 12 6 - 23 MG/DL    Creatinine 0.65 (L) 0.80 - 1.30 MG/DL    Est, Glom Filt Rate >90 >60 ml/min/1.73m2    Calcium 9.7 8.8 - 10.2 MG/DL    Total Bilirubin 0.2 0.0 - 1.2 MG/DL    ALT 18 12 - 65 U/L    AST 32 15 - 37 U/L    Alk Phosphatase 88 35 - 104 U/L    Total Protein 7.6 6.3 - 8.2 g/dL    Albumin 4.4 3.5 - 5.0 g/dL    Globulin 3.2 2.3 - 3.5 g/dL    Albumin/Globulin Ratio 1.4 1.0 - 1.9     Lipase   Result Value Ref Range    Lipase 25 13 - 60 U/L   Urinalysis w rflx microscopic   Result Value Ref Range    Color, UA YELLOW      Appearance CLEAR      Specific Gravity, UA 1.015 1.001 - 1.023      pH, Urine 7.0 5.0 - 9.0      Protein, UA Negative NEG mg/dL    Glucose, Ur Negative NEG mg/dL    Ketones, Urine Negative NEG mg/dL    Bilirubin, Urine Negative NEG      Blood, Urine Trace Intact (A) NEG      Urobilinogen, Urine 0.2 0.2 - 1.0 EU/dL    Nitrite, Urine Negative NEG      Leukocyte Esterase, Urine TRACE (A) NEG     Pregnancy, Urine   Result Value Ref Range    Pregnancy, Urine Negative

## 2024-11-21 ENCOUNTER — OFFICE VISIT (OUTPATIENT)
Dept: OBGYN CLINIC | Age: 24
End: 2024-11-21
Payer: COMMERCIAL

## 2024-11-21 VITALS
WEIGHT: 237 LBS | DIASTOLIC BLOOD PRESSURE: 78 MMHG | SYSTOLIC BLOOD PRESSURE: 114 MMHG | BODY MASS INDEX: 39.49 KG/M2 | HEIGHT: 65 IN

## 2024-11-21 DIAGNOSIS — M62.838 LEVATOR SPASM: ICD-10-CM

## 2024-11-21 DIAGNOSIS — N89.8 VAGINAL LESION: Primary | ICD-10-CM

## 2024-11-21 PROCEDURE — G8484 FLU IMMUNIZE NO ADMIN: HCPCS | Performed by: OBSTETRICS & GYNECOLOGY

## 2024-11-21 PROCEDURE — G8427 DOCREV CUR MEDS BY ELIG CLIN: HCPCS | Performed by: OBSTETRICS & GYNECOLOGY

## 2024-11-21 PROCEDURE — 1036F TOBACCO NON-USER: CPT | Performed by: OBSTETRICS & GYNECOLOGY

## 2024-11-21 PROCEDURE — 99214 OFFICE O/P EST MOD 30 MIN: CPT | Performed by: OBSTETRICS & GYNECOLOGY

## 2024-11-21 PROCEDURE — G8417 CALC BMI ABV UP PARAM F/U: HCPCS | Performed by: OBSTETRICS & GYNECOLOGY

## 2024-11-21 NOTE — PROGRESS NOTES
CC:   Chief Complaint   Patient presents with    Vaginal Pain     C/o possible vaginal cyst         HPI:    Yancy  is a 24 y.o. , , Patient's last menstrual period was 2024.,  who is seen for c/o vaginal pain. Reports she has multiple cysts that were very painful and opened. No hx of HSV. No hx of hydradenitis. Sexually active with 1 male partner (her ). Lesions resolved spontaneously after a few days.     Pain with sex since delivery as well.      GYN HISTORY:  As per HPI       Current Outpatient Medications on File Prior to Visit   Medication Sig Dispense Refill    HYDROcodone-acetaminophen (NORCO) 5-325 MG per tablet Take 1 tablet by mouth every 6 hours as needed for Pain for up to 5 days. Intended supply: 5 days. Take lowest dose possible to manage pain Max Daily Amount: 4 tablets 10 tablet 0    promethazine (PHENERGAN) 25 MG tablet Take 1 tablet by mouth every 6 hours as needed for Nausea 20 tablet 2    ARIPiprazole (ABILIFY) 10 MG tablet Take 1 tablet by mouth daily      DULoxetine (CYMBALTA) 30 MG extended release capsule PLEASE SEE ATTACHED FOR DETAILED DIRECTIONS      prazosin (MINIPRESS) 1 MG capsule TAKE 1 CAPSULE BY MOUTH EVERYDAY AT BEDTIME      ferrous sulfate (IRON 325) 325 (65 Fe) MG tablet Take 1 tablet by mouth daily 30 tablet 1    albuterol sulfate HFA (VENTOLIN HFA) 108 (90 Base) MCG/ACT inhaler Inhale 2 puffs into the lungs every 4 hours as needed for Wheezing 18 g 0    hydrOXYzine HCl (ATARAX) 25 MG tablet Take 1 tablet by mouth 3 times daily as needed      Prenatal Vit-Fe Fumarate-FA (PRENATAL PO) Take by mouth       No current facility-administered medications on file prior to visit.         Past Medical History:   Diagnosis Date    Asperger's disorder 2008    Asthma 2008    B12 deficiency     Depression 2008    History of panic attacks 2008    Mild pre-eclampsia 2024    PTSD (post-traumatic stress disorder)     Schizoaffective disorder (HCC)          Past Surgical

## 2024-11-27 LAB
HSV1 DNA SPEC QL NAA+PROBE: NEGATIVE
HSV2 DNA SPEC QL NAA+PROBE: NEGATIVE
SPECIMEN SOURCE: NORMAL

## 2024-12-05 ENCOUNTER — OFFICE VISIT (OUTPATIENT)
Dept: GASTROENTEROLOGY | Age: 24
End: 2024-12-05
Payer: COMMERCIAL

## 2024-12-05 ENCOUNTER — PREP FOR PROCEDURE (OUTPATIENT)
Dept: GASTROENTEROLOGY | Age: 24
End: 2024-12-05

## 2024-12-05 VITALS
SYSTOLIC BLOOD PRESSURE: 116 MMHG | BODY MASS INDEX: 39.94 KG/M2 | RESPIRATION RATE: 18 BRPM | OXYGEN SATURATION: 98 % | DIASTOLIC BLOOD PRESSURE: 81 MMHG | WEIGHT: 240 LBS | HEART RATE: 93 BPM

## 2024-12-05 DIAGNOSIS — R10.10 UPPER ABDOMINAL PAIN: ICD-10-CM

## 2024-12-05 DIAGNOSIS — K92.0 HEMATEMESIS, UNSPECIFIED WHETHER NAUSEA PRESENT: ICD-10-CM

## 2024-12-05 DIAGNOSIS — R11.2 NAUSEA AND VOMITING, UNSPECIFIED VOMITING TYPE: ICD-10-CM

## 2024-12-05 DIAGNOSIS — R10.10 UPPER ABDOMINAL PAIN: Primary | ICD-10-CM

## 2024-12-05 PROCEDURE — G8417 CALC BMI ABV UP PARAM F/U: HCPCS

## 2024-12-05 PROCEDURE — G8484 FLU IMMUNIZE NO ADMIN: HCPCS

## 2024-12-05 PROCEDURE — 99203 OFFICE O/P NEW LOW 30 MIN: CPT

## 2024-12-05 PROCEDURE — G8427 DOCREV CUR MEDS BY ELIG CLIN: HCPCS

## 2024-12-05 PROCEDURE — 1036F TOBACCO NON-USER: CPT

## 2024-12-05 RX ORDER — DICYCLOMINE HYDROCHLORIDE 10 MG/1
10 CAPSULE ORAL 4 TIMES DAILY PRN
Qty: 120 CAPSULE | Refills: 0 | Status: SHIPPED | OUTPATIENT
Start: 2024-12-05

## 2024-12-05 ASSESSMENT — ENCOUNTER SYMPTOMS
ABDOMINAL PAIN: 1
DIARRHEA: 1
VOMITING: 1
BLOOD IN STOOL: 1
NAUSEA: 1
CONSTIPATION: 1

## 2024-12-05 NOTE — PROGRESS NOTES
Yancy Cornejo (:  2000) is a 24 y.o. female,New patient, here for evaluation of the following chief complaint(s):  Abdominal Pain, Diarrhea, and Constipation (Vomiting, with streaks of blood (tore esophagus during pregnancy))       Assessment & Plan  Upper abdominal pain  She reports significant daily BL upper abdominal pain; vomiting daily and sometimes with blood streaks. Will plan for EGD for further evaluation. US negative for stones; will order HIDA with EF to rule out biliary colic.     Orders:    NM HEPATOBILIARY SCAN W EJECTION FRACTION; Future    dicyclomine (BENTYL) 10 MG capsule; Take 1 capsule by mouth 4 times daily as needed (cramping)    Nausea and vomiting, unspecified vomiting type  See Above          Hematemesis, unspecified whether nausea present   See above           No follow-ups on file.       Subjective   HPI    Pt is a 24 year old female, with a hx of asthma, aspergers, depression, PTSD, schizoaffective DO, who was referred for hematemesis. Seen in the ER on  for R sided abdominal pain, vomiting, diarrhea. She is 4 months post partum. US on  was negative for biliary issues; only fatty liver with borderline enlarged liver.     Labs in November: Hgb 12.2, Ferritin 10, Iron 29    Today: Patient states that her symptoms began when she was pregnant with her daughter (4 months post partum), it went away, but came back last month. She reports pain around periumbilical region, worse with eating. Sometimes lower abdominal pain. She is vomiting at least twice a day; she notes some streaks of blood in it as well. Says she developed an esophageal tear during pregnancy with lots of hematemesis. No dysphagia. She notices some bright red blood after wiping, but says she has hemorrhoids after pregnancy. She reports some alterations with constipation and diarrhea; she used to go every other day before her pregnancy and this was normal for her, but now different. On the days that she has

## 2024-12-05 NOTE — ASSESSMENT & PLAN NOTE
She reports significant daily BL upper abdominal pain; vomiting daily and sometimes with blood streaks. Will plan for EGD for further evaluation. US negative for stones; will order HIDA with EF to rule out biliary colic.     Orders:    NM HEPATOBILIARY SCAN W EJECTION FRACTION; Future    dicyclomine (BENTYL) 10 MG capsule; Take 1 capsule by mouth 4 times daily as needed (cramping)

## 2024-12-05 NOTE — PATIENT INSTRUCTIONS
-For the nausea, vomiting blood, upper abdominal pain, I would like to set you up for an EGD. We can get a HIDA scan as well to make sure your gallbladder is functioning okay; you did not have any stones on your ultrasounds.    -For the alterations in constipation/diarrhea; I would recommend taking miralax daily (can take up to three times a day). The cap is used for dosing. If this doesn't help, then send us a message and we can try something else.     -I will send you in Bentyl for the cramping; do not take it too often as it can make constipation worse.

## 2024-12-06 RX ORDER — SODIUM CHLORIDE 0.9 % (FLUSH) 0.9 %
5-40 SYRINGE (ML) INJECTION PRN
Status: CANCELLED | OUTPATIENT
Start: 2024-12-06

## 2024-12-06 RX ORDER — SODIUM CHLORIDE 9 MG/ML
25 INJECTION, SOLUTION INTRAVENOUS PRN
Status: CANCELLED | OUTPATIENT
Start: 2024-12-06

## 2024-12-06 RX ORDER — SODIUM CHLORIDE 0.9 % (FLUSH) 0.9 %
5-40 SYRINGE (ML) INJECTION EVERY 12 HOURS SCHEDULED
Status: CANCELLED | OUTPATIENT
Start: 2024-12-06

## 2024-12-12 NOTE — PROGRESS NOTES
Patient verified name, , and procedure.    Type: 1a; abbreviated assessment per anesthesia guidelines  Labs per surgeon: none  Labs per anesthesia: none      Instructed pt that they will be notified by the Gi Lab for time of arrival. If any questions please call the GI lab at [phone].    Follow diet and prep instructions per office. May have clear liquids until 4 hours prior to time of arrival. Please drink 32 ounces of non-caffeinated clear liquids 2 hours prior to your arrival to avoid dehydration.     Bath or shower the night before and the am of surgery with antibacterial soap. No lotions, oils, powders, cologne on skin. No make up, eye make up or jewelry. Wear loose fitting comfortable, clean clothing.     Must have adult present in building the entire time .     Medications for the day of procedure : CYMBALTA.  Bring inhaler to hospital.     The following discharge instructions reviewed with patient: medication given during procedure may cause drowsiness for several hours, therefore, do not drive or operate machinery for remainder of the day, no alcohol on the day of your procedure, resume regular diet and activity unless otherwise directed, for mild sore throat you may use Cepacol throat lozenges or warm salt water gargles as needed, call your physician for any problems or questions. Patient verbalizes understanding.

## 2024-12-16 ENCOUNTER — HOSPITAL ENCOUNTER (OUTPATIENT)
Dept: NUCLEAR MEDICINE | Age: 24
Discharge: HOME | End: 2024-12-19
Payer: COMMERCIAL

## 2024-12-16 VITALS — BODY MASS INDEX: 39.94 KG/M2 | WEIGHT: 240 LBS

## 2024-12-16 DIAGNOSIS — R10.10 UPPER ABDOMINAL PAIN: ICD-10-CM

## 2024-12-16 PROCEDURE — 78227 HEPATOBIL SYST IMAGE W/DRUG: CPT

## 2024-12-16 PROCEDURE — 3430000000 HC RX DIAGNOSTIC RADIOPHARMACEUTICAL

## 2024-12-16 PROCEDURE — 6360000004 HC RX CONTRAST MEDICATION

## 2024-12-16 PROCEDURE — A9537 TC99M MEBROFENIN: HCPCS

## 2024-12-16 RX ORDER — SINCALIDE 5 UG/5ML
0.02 INJECTION, POWDER, LYOPHILIZED, FOR SOLUTION INTRAVENOUS ONCE
Status: COMPLETED | OUTPATIENT
Start: 2024-12-16 | End: 2024-12-16

## 2024-12-16 RX ORDER — KIT FOR THE PREPARATION OF TECHNETIUM TC 99M MEBROFENIN 45 MG/10ML
6.2 INJECTION, POWDER, LYOPHILIZED, FOR SOLUTION INTRAVENOUS
Status: COMPLETED | OUTPATIENT
Start: 2024-12-16 | End: 2024-12-16

## 2024-12-16 RX ADMIN — MEBROFENIN 6.2 MILLICURIE: 45 INJECTION, POWDER, LYOPHILIZED, FOR SOLUTION INTRAVENOUS at 10:58

## 2024-12-16 RX ADMIN — SINCALIDE 2.18 MCG: 5 INJECTION, POWDER, LYOPHILIZED, FOR SOLUTION INTRAVENOUS at 11:43

## 2024-12-17 ENCOUNTER — ANESTHESIA EVENT (OUTPATIENT)
Dept: ENDOSCOPY | Age: 24
End: 2024-12-17
Payer: COMMERCIAL

## 2024-12-18 ENCOUNTER — HOSPITAL ENCOUNTER (OUTPATIENT)
Age: 24
Setting detail: OUTPATIENT SURGERY
Discharge: HOME | End: 2024-12-18
Attending: INTERNAL MEDICINE | Admitting: INTERNAL MEDICINE
Payer: COMMERCIAL

## 2024-12-18 ENCOUNTER — ANESTHESIA (OUTPATIENT)
Dept: ENDOSCOPY | Age: 24
End: 2024-12-18
Payer: COMMERCIAL

## 2024-12-18 VITALS
HEIGHT: 65 IN | RESPIRATION RATE: 18 BRPM | HEART RATE: 77 BPM | DIASTOLIC BLOOD PRESSURE: 83 MMHG | TEMPERATURE: 98 F | SYSTOLIC BLOOD PRESSURE: 131 MMHG | WEIGHT: 244.3 LBS | BODY MASS INDEX: 40.7 KG/M2 | OXYGEN SATURATION: 99 %

## 2024-12-18 LAB — HCG UR QL: NEGATIVE

## 2024-12-18 PROCEDURE — 2500000003 HC RX 250 WO HCPCS: Performed by: STUDENT IN AN ORGANIZED HEALTH CARE EDUCATION/TRAINING PROGRAM

## 2024-12-18 PROCEDURE — 7100000011 HC PHASE II RECOVERY - ADDTL 15 MIN: Performed by: INTERNAL MEDICINE

## 2024-12-18 PROCEDURE — 3700000000 HC ANESTHESIA ATTENDED CARE: Performed by: INTERNAL MEDICINE

## 2024-12-18 PROCEDURE — 6360000002 HC RX W HCPCS: Performed by: NURSE ANESTHETIST, CERTIFIED REGISTERED

## 2024-12-18 PROCEDURE — 7100000010 HC PHASE II RECOVERY - FIRST 15 MIN: Performed by: INTERNAL MEDICINE

## 2024-12-18 PROCEDURE — 3609012400 HC EGD TRANSORAL BIOPSY SINGLE/MULTIPLE: Performed by: INTERNAL MEDICINE

## 2024-12-18 PROCEDURE — 81025 URINE PREGNANCY TEST: CPT

## 2024-12-18 PROCEDURE — 2709999900 HC NON-CHARGEABLE SUPPLY: Performed by: INTERNAL MEDICINE

## 2024-12-18 PROCEDURE — 43239 EGD BIOPSY SINGLE/MULTIPLE: CPT | Performed by: INTERNAL MEDICINE

## 2024-12-18 RX ORDER — SODIUM CHLORIDE 0.9 % (FLUSH) 0.9 %
5-40 SYRINGE (ML) INJECTION EVERY 12 HOURS SCHEDULED
Status: DISCONTINUED | OUTPATIENT
Start: 2024-12-18 | End: 2024-12-18 | Stop reason: HOSPADM

## 2024-12-18 RX ORDER — SODIUM CHLORIDE 9 MG/ML
INJECTION, SOLUTION INTRAVENOUS PRN
Status: DISCONTINUED | OUTPATIENT
Start: 2024-12-18 | End: 2024-12-18 | Stop reason: HOSPADM

## 2024-12-18 RX ORDER — SODIUM CHLORIDE 0.9 % (FLUSH) 0.9 %
5-40 SYRINGE (ML) INJECTION PRN
Status: DISCONTINUED | OUTPATIENT
Start: 2024-12-18 | End: 2024-12-18 | Stop reason: HOSPADM

## 2024-12-18 RX ORDER — PROPOFOL 10 MG/ML
INJECTION, EMULSION INTRAVENOUS
Status: DISCONTINUED | OUTPATIENT
Start: 2024-12-18 | End: 2024-12-18 | Stop reason: SDUPTHER

## 2024-12-18 RX ORDER — LIDOCAINE HYDROCHLORIDE 20 MG/ML
INJECTION, SOLUTION EPIDURAL; INFILTRATION; INTRACAUDAL; PERINEURAL
Status: DISCONTINUED | OUTPATIENT
Start: 2024-12-18 | End: 2024-12-18 | Stop reason: SDUPTHER

## 2024-12-18 RX ORDER — LIDOCAINE HYDROCHLORIDE 10 MG/ML
1 INJECTION, SOLUTION INFILTRATION; PERINEURAL
Status: DISCONTINUED | OUTPATIENT
Start: 2024-12-18 | End: 2024-12-18 | Stop reason: HOSPADM

## 2024-12-18 RX ORDER — SODIUM CHLORIDE 9 MG/ML
25 INJECTION, SOLUTION INTRAVENOUS PRN
Status: DISCONTINUED | OUTPATIENT
Start: 2024-12-18 | End: 2024-12-18 | Stop reason: HOSPADM

## 2024-12-18 RX ORDER — SODIUM CHLORIDE, SODIUM LACTATE, POTASSIUM CHLORIDE, CALCIUM CHLORIDE 600; 310; 30; 20 MG/100ML; MG/100ML; MG/100ML; MG/100ML
INJECTION, SOLUTION INTRAVENOUS CONTINUOUS
Status: DISCONTINUED | OUTPATIENT
Start: 2024-12-18 | End: 2024-12-18 | Stop reason: HOSPADM

## 2024-12-18 RX ORDER — NALOXONE HYDROCHLORIDE 0.4 MG/ML
INJECTION, SOLUTION INTRAMUSCULAR; INTRAVENOUS; SUBCUTANEOUS PRN
Status: DISCONTINUED | OUTPATIENT
Start: 2024-12-18 | End: 2024-12-18 | Stop reason: HOSPADM

## 2024-12-18 RX ADMIN — PROPOFOL 250 MCG/KG/MIN: 10 INJECTION, EMULSION INTRAVENOUS at 08:36

## 2024-12-18 RX ADMIN — LIDOCAINE HYDROCHLORIDE 50 MG: 20 INJECTION, SOLUTION EPIDURAL; INFILTRATION; INTRACAUDAL; PERINEURAL at 08:33

## 2024-12-18 RX ADMIN — SODIUM CHLORIDE, PRESERVATIVE FREE 5 ML: 5 INJECTION INTRAVENOUS at 08:33

## 2024-12-18 RX ADMIN — PROPOFOL 50 MG: 10 INJECTION, EMULSION INTRAVENOUS at 08:34

## 2024-12-18 RX ADMIN — PROPOFOL 50 MG: 10 INJECTION, EMULSION INTRAVENOUS at 08:35

## 2024-12-18 RX ADMIN — PROPOFOL 100 MG: 10 INJECTION, EMULSION INTRAVENOUS at 08:33

## 2024-12-18 ASSESSMENT — PAIN - FUNCTIONAL ASSESSMENT: PAIN_FUNCTIONAL_ASSESSMENT: 0-10

## 2024-12-18 NOTE — ANESTHESIA PRE PROCEDURE
Department of Anesthesiology  Preprocedure Note       Name:  Yancy Cornejo   Age:  24 y.o.  :  2000                                          MRN:  [id number]         Date:  2024      Surgeon: Surgeon(s):  Lynne Lo MD    Procedure: Procedure(s):  ESOPHAGOGASTRODUODENOSCOPY DIAGNOSTIC ONLY    Medications prior to admission:   Prior to Admission medications    Medication Sig Start Date End Date Taking? Authorizing Provider   dicyclomine (BENTYL) 10 MG capsule Take 1 capsule by mouth 4 times daily as needed (cramping) 24  Yes Gaston Byrd PA   ARIPiprazole (ABILIFY) 10 MG tablet Take 1 tablet by mouth every evening 24  Yes Kathie Guzman MD   DULoxetine (CYMBALTA) 30 MG extended release capsule PLEASE SEE ATTACHED FOR DETAILED DIRECTIONS 24  Yes Kathie Guzman MD   prazosin (MINIPRESS) 1 MG capsule TAKE 1 CAPSULE BY MOUTH EVERYDAY AT BEDTIME 24  Yes Kathie Guzman MD   ferrous sulfate (IRON 325) 325 (65 Fe) MG tablet Take 1 tablet by mouth daily  Patient taking differently: Take 1 tablet by mouth in the morning and at bedtime 24  Yes Winnie Vital, APRN - NP   albuterol sulfate HFA (VENTOLIN HFA) 108 (90 Base) MCG/ACT inhaler Inhale 2 puffs into the lungs every 4 hours as needed for Wheezing 7/15/24  Yes Isael Esparza MD   hydrOXYzine HCl (ATARAX) 25 MG tablet Take 1 tablet by mouth 3 times daily as needed 24  Yes Kathie Guzman MD   Prenatal Vit-Fe Fumarate-FA (PRENATAL PO) Take by mouth   Yes Kathie Guzman MD       Current medications:    Current Facility-Administered Medications   Medication Dose Route Frequency Provider Last Rate Last Admin    lidocaine 1 % injection 1 mL  1 mL IntraDERmal Once PRN Tremaine Raphael MD        lactated ringers infusion   IntraVENous Continuous Tremaine Raphael MD        sodium chloride flush 0.9 % injection 5-40 mL  5-40 mL IntraVENous 2 times per day Tremaine Raphael MD

## 2024-12-18 NOTE — H&P
GASTROENTEROLOGY H&P    Yancy Cornejo is 24 y.o. y/o female R sided abdominal pain, vomiting, diarrhea. She is 4 months post partum. US on 11/19 was negative for biliary issues; only fatty liver with borderline enlarged live . that her symptoms began when she was pregnant with her daughter (4 months post partum), it went away, but came back last month. She reports pain around periumbilical region, worse with eating. Sometimes lower abdominal pain. She is vomiting at least twice a day; she notes some streaks of blood in it as well. Says she developed an esophageal tear during pregnancy with lots of hematemesis. No dysphagia. She notices some bright red blood after wiping, but says she has hemorrhoids after pregnancy. She reports some alterations with constipation and diarrhea; she used to go every other day before her pregnancy and this was normal for her, but now different. On the days that she has diarrhea, she will go multiple times a day and then nothing the next day. Also lost about 20 lbs during her pregnancy. No Fhx of colon CA. No NSAIDs; no blood thinners. She denies any heartburn symptoms. No prior endoscopy.        Past Medical History:   Diagnosis Date    Asperger's disorder 2008    Asthma 2008    B12 deficiency     Depression 2008    History of panic attacks 2008    Mild pre-eclampsia 08/12/2024    PTSD (post-traumatic stress disorder)     Schizoaffective disorder (HCC)      Past Surgical History:   Procedure Laterality Date    TONSILLECTOMY AND ADENOIDECTOMY Bilateral 2008    WISDOM TOOTH EXTRACTION  2018     Family History   Problem Relation Age of Onset    Breast Cancer Paternal Grandmother         60s    Heart Surgery Maternal Grandfather     No Known Problems Father     No Known Problems Mother     Depression Brother     Anxiety Disorder Brother     No Known Problems Sister     No Known Problems Sister     Heart Defect Paternal Cousin         open heart surgery as an infant    Ovarian Cancer Neg Hx

## 2024-12-18 NOTE — DISCHARGE INSTRUCTIONS
Gastrointestinal Esophagogastroduodenoscopy (EGD)/ Endoscopic Ultrasound(EUS)- Upper Exam Discharge Instructions    1. Call Dr. Lo for any problems or questions.  2. Contact the doctor's office for follow up appointment as directed.  3. Medication may cause drowsiness for several hours, therefore, do not drive or operate machinery for remainder of the day.  4. No alcohol today.  5. Do not make any important decisions such as signing legal paperwork.  6. Ordinarily, you may resume regular diet and activity after exam unless otherwise specified by your physician.  7. For mild soreness in your throat you may use Cepacol throat lozenges or warm  salt-water gargles as needed.    Any additional instructions:     Impression: Normal esophagus. Normal stomach. Biopsied. Normal examined duodenum.     Recommendation: Await pathology results. Return to GI clinic at appointment to be scheduled. Patient has a contact number available for emergencies. The signs and symptoms of potential delayed complications were discussed with the patient. Return to normal activities tomorrow. Written discharge instructions were provided to the patient.

## 2024-12-18 NOTE — ANESTHESIA POSTPROCEDURE EVALUATION
Department of Anesthesiology  Postprocedure Note    Patient: Yancy Cornejo  MRN: [id number]  YOB: 2000  Date of evaluation: 12/18/2024    Procedure Summary       Date: 12/18/24 Room / Location: [facility] ENDO 01 / [facility] ENDOSCOPY    Anesthesia Start: 0829 Anesthesia Stop: 0847    Procedure: ESOPHAGOGASTRODUODENOSCOPY BIOPSY (Upper GI Region) Diagnosis:       Upper abdominal pain      Vomiting blood      (Upper abdominal pain [R10.10])      (Vomiting blood [K92.0])    Surgeons: Lynne Lo MD Responsible Provider: Cam Craig DO    Anesthesia Type: TIVA ASA Status: 3            Anesthesia Type: No value filed.    Julianna Phase I:      Julianna Phase II: Julianna Score: 10    Anesthesia Post Evaluation    Patient location during evaluation: PACU  Level of consciousness: awake and alert  Airway patency: patent  Nausea & Vomiting: no nausea  Cardiovascular status: hemodynamically stable  Respiratory status: acceptable  Hydration status: euvolemic  Pain management: satisfactory to patient    No notable events documented.

## 2024-12-22 ENCOUNTER — HOSPITAL ENCOUNTER (EMERGENCY)
Age: 24
Discharge: HOME | End: 2024-12-22
Payer: COMMERCIAL

## 2024-12-22 ENCOUNTER — APPOINTMENT (OUTPATIENT)
Dept: GENERAL RADIOLOGY | Age: 24
End: 2024-12-22
Payer: COMMERCIAL

## 2024-12-22 VITALS
HEIGHT: 65 IN | RESPIRATION RATE: 16 BRPM | SYSTOLIC BLOOD PRESSURE: 120 MMHG | WEIGHT: 244 LBS | OXYGEN SATURATION: 100 % | HEART RATE: 74 BPM | TEMPERATURE: 98.3 F | DIASTOLIC BLOOD PRESSURE: 77 MMHG | BODY MASS INDEX: 40.65 KG/M2

## 2024-12-22 DIAGNOSIS — M25.522 LEFT ELBOW PAIN: Primary | ICD-10-CM

## 2024-12-22 PROCEDURE — 73080 X-RAY EXAM OF ELBOW: CPT

## 2024-12-22 PROCEDURE — 99283 EMERGENCY DEPT VISIT LOW MDM: CPT

## 2024-12-22 PROCEDURE — 6370000000 HC RX 637 (ALT 250 FOR IP): Performed by: PHYSICIAN ASSISTANT

## 2024-12-22 RX ORDER — ACETAMINOPHEN 500 MG
1000 TABLET ORAL
Status: COMPLETED | OUTPATIENT
Start: 2024-12-22 | End: 2024-12-22

## 2024-12-22 RX ADMIN — ACETAMINOPHEN 1000 MG: 500 TABLET ORAL at 10:43

## 2024-12-22 ASSESSMENT — PAIN SCALES - GENERAL
PAINLEVEL_OUTOF10: 5
PAINLEVEL_OUTOF10: 3
PAINLEVEL_OUTOF10: 5

## 2024-12-22 ASSESSMENT — PAIN - FUNCTIONAL ASSESSMENT
PAIN_FUNCTIONAL_ASSESSMENT: 0-10
PAIN_FUNCTIONAL_ASSESSMENT: 0-10

## 2024-12-22 ASSESSMENT — PAIN DESCRIPTION - ORIENTATION
ORIENTATION: LEFT
ORIENTATION: LEFT

## 2024-12-22 ASSESSMENT — PAIN DESCRIPTION - LOCATION
LOCATION: ARM
LOCATION: ELBOW

## 2024-12-22 NOTE — ED PROVIDER NOTES
Emergency Department Provider Note       PCP: Kevin Valerio MD   Age: 24 y.o.   Sex: female     DISPOSITION Decision To Discharge 12/22/2024 11:25:16 AM   DISPOSITION CONDITION Stable            ICD-10-CM    1. Left elbow pain  M25.522           Medical Decision Making     Radiology is negative for anything acute.  Will encourage her to ice the affected joint, ibuprofen.  Soft tissue and musculoskeletal injury.  No further treatment or workup is neccessary at his juncture.     1 acute illness with systemic symptoms.  Over the counter drug management performed.  Shared medical decision making was utilized in creating the patients health plan today.  I independently ordered and reviewed each unique test.  I interpreted the X-rays NO fracture.    History     24 female.  Presents with significant other.  Had a fall this morning around 2 AM.  Slipped in dog vomit and fell on her left elbow.  Has had pain since.  Did not take any medications yet.  Presents here for evaluation        Physical Exam     Vitals signs and nursing note reviewed:  Vitals:    12/22/24 1005 12/22/24 1131   BP: 125/75 120/77   Pulse: 79 74   Resp: 15 16   Temp: 97.5 °F (36.4 °C) 98.3 °F (36.8 °C)   TempSrc: Oral Oral   SpO2: 100% 100%   Weight: 110.7 kg (244 lb)    Height: 1.651 m (5' 5\")       Physical Exam  Vitals and nursing note reviewed.   Constitutional:       General: She is not in acute distress.     Appearance: Normal appearance. She is not toxic-appearing.   HENT:      Head: Normocephalic and atraumatic.   Cardiovascular:      Rate and Rhythm: Normal rate.   Pulmonary:      Effort: Pulmonary effort is normal. No respiratory distress.   Musculoskeletal:         General: Normal range of motion.      Comments: Mild tenderness on the left olecranon.  No bruising or abrasion.   Neurological:      General: No focal deficit present.      Mental Status: She is alert and oriented to person, place, and time.   Psychiatric:         Behavior:

## 2024-12-22 NOTE — ED TRIAGE NOTES
Pt ambulatory to ED with spouse after fall this morning. States she slipped in the kitchen and hit her L arm on the wall. States she did hit the back of her head. Denies LOC, pt does take cymbalta.

## 2024-12-22 NOTE — ED NOTES
Patient mobility status  with no difficulty.     I have reviewed discharge instructions with the patient and spouse.  The patient and spouse verbalized understanding.    Patient left ED via Discharge Method: ambulatory to Home with Spouse.    Opportunity for questions and clarification provided.     Patient given 0 scripts.

## 2025-01-03 ENCOUNTER — TELEPHONE (OUTPATIENT)
Age: 25
End: 2025-01-03

## 2025-01-03 NOTE — TELEPHONE ENCOUNTER
Called pt to review results of EGD/biopsies per Dr. Lo. Informed pt of overall normal/unremarkable biopsy results, pt verbalized understanding. Pt agreeable to schedule F/U visit with [facility] Edwards as advised by Dr. Lo. Scheduled appt for 1/30/25 at 0830 with Jerry. Gave address for [facility] office. Pt agreeable to reach out with any questions or changes prior to appt.       ---------------------------      Per Dr. Lo:  \"- follow up pathology results   - schedule for follow up with Jerry     [EGD 12/18/24:]  \"Impression:         -  Normal esophagus.         -  Normal stomach.  Biopsied.         -  Normal examined duodenum.  Recommendation:         -  Await pathology results.         -  Return to GI clinic at appointment to be scheduled.\"    [Pathology 12/18/24:]  \"A:  Stomach, biopsy:   - Corpus and antral mucosa with mild reactive changes and focal surface erosion.   - Inflammatory changes suggestive H. pylori are not present and no organisms are seen on routine H&E stain.\"

## 2025-01-06 ENCOUNTER — TELEPHONE (OUTPATIENT)
Age: 25
End: 2025-01-06

## 2025-01-06 ENCOUNTER — HOSPITAL ENCOUNTER (EMERGENCY)
Age: 25
Discharge: HOME | End: 2025-01-06
Attending: EMERGENCY MEDICINE
Payer: COMMERCIAL

## 2025-01-06 VITALS
SYSTOLIC BLOOD PRESSURE: 128 MMHG | DIASTOLIC BLOOD PRESSURE: 84 MMHG | HEART RATE: 79 BPM | BODY MASS INDEX: 40.65 KG/M2 | RESPIRATION RATE: 23 BRPM | OXYGEN SATURATION: 98 % | TEMPERATURE: 98 F | HEIGHT: 65 IN | WEIGHT: 244 LBS

## 2025-01-06 DIAGNOSIS — I95.1 ORTHOSTASIS: ICD-10-CM

## 2025-01-06 DIAGNOSIS — E86.0 DEHYDRATION: ICD-10-CM

## 2025-01-06 DIAGNOSIS — R55 SYNCOPE AND COLLAPSE: Primary | ICD-10-CM

## 2025-01-06 LAB
ALBUMIN SERPL-MCNC: 4.5 G/DL (ref 3.5–5)
ALBUMIN/GLOB SERPL: 1.5 (ref 1–1.9)
ALP SERPL-CCNC: 85 U/L (ref 35–104)
ALT SERPL-CCNC: 17 U/L (ref 12–65)
ANION GAP SERPL CALC-SCNC: 11 MMOL/L (ref 7–16)
AST SERPL-CCNC: 22 U/L (ref 15–37)
BASOPHILS # BLD: 0.1 K/UL (ref 0–0.2)
BASOPHILS NFR BLD: 1 % (ref 0–2)
BILIRUB SERPL-MCNC: 0.3 MG/DL (ref 0–1.2)
BUN SERPL-MCNC: 15 MG/DL (ref 6–23)
CALCIUM SERPL-MCNC: 9.4 MG/DL (ref 8.8–10.2)
CHLORIDE SERPL-SCNC: 106 MMOL/L (ref 98–107)
CO2 SERPL-SCNC: 23 MMOL/L (ref 20–29)
CREAT SERPL-MCNC: 0.61 MG/DL (ref 0.8–1.3)
DIFFERENTIAL METHOD BLD: ABNORMAL
EOSINOPHIL # BLD: 0.2 K/UL (ref 0–0.8)
EOSINOPHIL NFR BLD: 3 % (ref 0.5–7.8)
ERYTHROCYTE [DISTWIDTH] IN BLOOD BY AUTOMATED COUNT: 14.7 % (ref 11.9–14.6)
GLOBULIN SER CALC-MCNC: 3.1 G/DL (ref 2.3–3.5)
GLUCOSE SERPL-MCNC: 92 MG/DL (ref 65–100)
HCT VFR BLD AUTO: 39.4 % (ref 35.8–46.3)
HGB BLD-MCNC: 13.5 G/DL (ref 11.7–15.4)
IMM GRANULOCYTES # BLD AUTO: 0 K/UL (ref 0–0.5)
IMM GRANULOCYTES NFR BLD AUTO: 0 % (ref 0–5)
INR PPP: 1
LYMPHOCYTES # BLD: 2 K/UL (ref 0.5–4.6)
LYMPHOCYTES NFR BLD: 28 % (ref 13–44)
MCH RBC QN AUTO: 27.1 PG (ref 26.1–32.9)
MCHC RBC AUTO-ENTMCNC: 34.3 G/DL (ref 31.4–35)
MCV RBC AUTO: 79.1 FL (ref 82–102)
MONOCYTES # BLD: 0.5 K/UL (ref 0.1–1.3)
MONOCYTES NFR BLD: 7 % (ref 4–12)
NEUTS SEG # BLD: 4.4 K/UL (ref 1.7–8.2)
NEUTS SEG NFR BLD: 61 % (ref 43–78)
NRBC # BLD: 0 K/UL (ref 0–0.2)
PLATELET # BLD AUTO: 364 K/UL (ref 150–450)
PMV BLD AUTO: 9 FL (ref 9.4–12.3)
POTASSIUM SERPL-SCNC: 4.4 MMOL/L (ref 3.5–5.1)
PROT SERPL-MCNC: 7.6 G/DL (ref 6.3–8.2)
PROTHROMBIN TIME: 13.1 SEC (ref 11.3–14.9)
RBC # BLD AUTO: 4.98 M/UL (ref 4.05–5.2)
SODIUM SERPL-SCNC: 140 MMOL/L (ref 133–143)
TROPONIN T SERPL HS-MCNC: <6 NG/L (ref 0–14)
WBC # BLD AUTO: 7.2 K/UL (ref 4.3–11.1)

## 2025-01-06 PROCEDURE — 85610 PROTHROMBIN TIME: CPT

## 2025-01-06 PROCEDURE — 85025 COMPLETE CBC W/AUTO DIFF WBC: CPT

## 2025-01-06 PROCEDURE — 93005 ELECTROCARDIOGRAM TRACING: CPT | Performed by: EMERGENCY MEDICINE

## 2025-01-06 PROCEDURE — 84484 ASSAY OF TROPONIN QUANT: CPT

## 2025-01-06 PROCEDURE — 99284 EMERGENCY DEPT VISIT MOD MDM: CPT

## 2025-01-06 PROCEDURE — 80053 COMPREHEN METABOLIC PANEL: CPT

## 2025-01-06 ASSESSMENT — ENCOUNTER SYMPTOMS
EYE REDNESS: 0
SHORTNESS OF BREATH: 1
RECTAL PAIN: 0
VOMITING: 0
VOICE CHANGE: 0
COLOR CHANGE: 0
EYE PAIN: 1
BACK PAIN: 0
CHEST TIGHTNESS: 1

## 2025-01-06 ASSESSMENT — PAIN DESCRIPTION - LOCATION: LOCATION: HEAD

## 2025-01-06 ASSESSMENT — PAIN - FUNCTIONAL ASSESSMENT: PAIN_FUNCTIONAL_ASSESSMENT: 0-10

## 2025-01-06 ASSESSMENT — PAIN SCALES - GENERAL: PAINLEVEL_OUTOF10: 5

## 2025-01-06 NOTE — TELEPHONE ENCOUNTER
Patient states she has been passing out, blacking out, completely, falling to ground, unconscious for about 2 minutes, 4 x daily x 6 weeks.   Passes out when standing and when sitting.   No warning with no dizziness, light headedness, or faint feeling before she passes out.  Often feels heart racing in chest during syncopal episodes.   After syncopal episodes, vision is often blurred and she often sees stars or dots, and then has severe pounding migraine headache for several hours.   Home BP monitor is not working.   Not currently taking any cardiac meds.   Currently scheduled to see Dr. Ring tomorrow at 4:00 pm.     Patient asks for any recommendations from Dr. Ring for frequent syncopal episodes.     Advised patient that I will notify Dr. Ring of above and call with his response. Advised patient to go to [facility] ER for immediate evaluation of any recurrent syncope or near syncope, if symptoms increase, or if she feels she is in distress. Patient verbalized understanding.

## 2025-01-06 NOTE — TELEPHONE ENCOUNTER
Pt called in requesting an appt with provider. Stated she has been continuously having syncope episodes. Stated that per Dr Ring if they continued after she delivered then to come back in, pt stated she is 5 mos post partum and is still having episodes. Pt has made an appt for tomorrow in the [facility] office and is aware of the location as she stated she is use to going to [facility], she verbally confirmed appt.

## 2025-01-06 NOTE — TELEPHONE ENCOUNTER
Audie Ring III, MD Keener, Lynn F RN  Caller: Unspecified (Today, 12:33 PM)  Can address in the office tomorrow, agree with ER recommendation if symptoms worsen in the meantime.

## 2025-01-07 ENCOUNTER — OFFICE VISIT (OUTPATIENT)
Age: 25
End: 2025-01-07

## 2025-01-07 VITALS
OXYGEN SATURATION: 98 % | SYSTOLIC BLOOD PRESSURE: 134 MMHG | WEIGHT: 242 LBS | DIASTOLIC BLOOD PRESSURE: 96 MMHG | HEIGHT: 65 IN | BODY MASS INDEX: 40.32 KG/M2 | HEART RATE: 105 BPM

## 2025-01-07 DIAGNOSIS — R55 SYNCOPE, UNSPECIFIED SYNCOPE TYPE: Primary | ICD-10-CM

## 2025-01-07 LAB
EKG ATRIAL RATE: 103 BPM
EKG DIAGNOSIS: NORMAL
EKG P AXIS: 57 DEGREES
EKG P-R INTERVAL: 140 MS
EKG Q-T INTERVAL: 313 MS
EKG QRS DURATION: 76 MS
EKG QTC CALCULATION (BAZETT): 414 MS
EKG R AXIS: 70 DEGREES
EKG T AXIS: 35 DEGREES
EKG VENTRICULAR RATE: 105 BPM

## 2025-01-07 PROCEDURE — 93010 ELECTROCARDIOGRAM REPORT: CPT | Performed by: INTERNAL MEDICINE

## 2025-01-07 RX ORDER — ETONOGESTREL 68 MG/1
IMPLANT SUBCUTANEOUS
COMMUNITY
Start: 2024-10-01

## 2025-01-07 NOTE — ED TRIAGE NOTES
PT to room 7 via wheelchair. Per patient, she's been having syncople episodes and increases in heart rate for the past few months. Patient was told by cardiologist to go to [facility] ER for repeat in episodes. PT reports having an episodes of \"heart pounding and feeling like passing out\" 15min pta. PT reports chest pain to left side near rib cage during episode. PT denies chest pain at this time. Pt denies SOB at this time.

## 2025-01-07 NOTE — ED NOTES
Orthostatic Vitals:      1/6/2025     8:48 PM   Orthostatic Vitals   Blood Pressure Lying 135/81   Pulse Lying 85 PER MINUTE   Blood Pressure Sitting 140/94   Pulse Sitting 99 PER MINUTE   Blood Pressure Standing 140/99   Pulse Standing 108 PER MINUTE

## 2025-01-07 NOTE — ED PROVIDER NOTES
Communication with Friends and Family: More than three times a week     Frequency of Social Gatherings with Friends and Family: Once a week   Intimate Partner Violence: Unknown (3/6/2024)    [facility]    Intimate Partner Violence     Fear of Current or Ex-Partner: Not asked     Emotionally Abused: Not asked     Physically Abused: Not asked     Sexually Abused: Not asked   Housing Stability: Low Risk  (8/12/2024)    Housing Stability Vital Sign     Unable to Pay for Housing in the Last Year: No     Number of Times Moved in the Last Year: 1     Homeless in the Last Year: No        Previous Medications    ALBUTEROL SULFATE HFA (VENTOLIN HFA) 108 (90 BASE) MCG/ACT INHALER    Inhale 2 puffs into the lungs every 4 hours as needed for Wheezing    ARIPIPRAZOLE (ABILIFY) 10 MG TABLET    Take 1 tablet by mouth every evening    DICYCLOMINE (BENTYL) 10 MG CAPSULE    Take 1 capsule by mouth 4 times daily as needed (cramping)    DULOXETINE (CYMBALTA) 30 MG EXTENDED RELEASE CAPSULE    PLEASE SEE ATTACHED FOR DETAILED DIRECTIONS    FERROUS SULFATE (IRON 325) 325 (65 FE) MG TABLET    Take 1 tablet by mouth daily    HYDROXYZINE HCL (ATARAX) 25 MG TABLET    Take 1 tablet by mouth 3 times daily as needed    PRAZOSIN (MINIPRESS) 1 MG CAPSULE    TAKE 1 CAPSULE BY MOUTH EVERYDAY AT BEDTIME    PRENATAL VIT-FE FUMARATE-FA (PRENATAL PO)    Take by mouth        Results from this emergency department visit:      Results for orders placed or performed during the hospital encounter of 01/06/25   CBC with Auto Differential   Result Value Ref Range    WBC 7.2 4.3 - 11.1 K/uL    RBC 4.98 4.05 - 5.20 M/uL    Hemoglobin 13.5 11.7 - 15.4 g/dL    Hematocrit 39.4 35.8 - 46.3 %    MCV 79.1 (L) 82.0 - 102.0 FL    MCH 27.1 26.1 - 32.9 PG    MCHC 34.3 31.4 - 35.0 g/dL    RDW 14.7 (H) 11.9 - 14.6 %    Platelets 364 150 - 450 K/uL    MPV 9.0 (L) 9.4 - 12.3 FL    nRBC 0.00 0.0 - 0.2 K/uL    Differential Type AUTOMATED

## 2025-01-07 NOTE — ED NOTES
Pt presents to the ED for c/o syncope this evening.  States that she has been having issues with her heart racing and syncope.  States that she does have an appt with the cardiologist next week.

## 2025-01-07 NOTE — DISCHARGE INSTRUCTIONS
Drink plenty of fluid  Follow-up with your cardiologist tomorrow  Return to the ER for any new, worsening or life-threatening symptoms

## 2025-01-09 ASSESSMENT — ENCOUNTER SYMPTOMS
ABDOMINAL PAIN: 0
SHORTNESS OF BREATH: 0

## 2025-01-09 NOTE — PROGRESS NOTES
[facility] CARDIOLOGY  [address] [place] [address], [address]  [place] [address]  PHONE: [phone]      25    NAME:  Yancy Cornejo  : 2000  MRN: [id number]         SUBJECTIVE:   Yancy Cornejo is a 24 y.o. female seen for a follow up visit regarding the following:     Chief Complaint   Patient presents with    Loss of Consciousness    Chest Pain            HPI:  Follow up  Loss of Consciousness and Chest Pain   .    Ms. Cornejo presents today for follow-up.  Patient was last seen by me back in April for POTS like symptoms.  Was pregnant at that time, has since delivered a healthy baby.  Says she did well couple of months after her delivery but in the last 6 to 8 weeks, she has had more tachycardia, dizziness and syncope.  She is attempting to hydrate aggressively, Has been try to get more salt in her diet however symptoms have progressed.  Limiting her ability to function.  She also notes she had a syncopal episode while she was holding a baby, this really worries her.  No other changes to her symptomatology.      Loss of Consciousness  Associated symptoms include chest pain. Pertinent negatives include no abdominal pain, diaphoresis, fever or focal weakness.   Chest Pain   Associated symptoms include syncope. Pertinent negatives include no abdominal pain, diaphoresis, fever or shortness of breath.         Cardiac Medications       Antiadrenergic Antihypertensives       prazosin (MINIPRESS) 1 MG capsule TAKE 1 CAPSULE BY MOUTH EVERYDAY AT BEDTIME                  Past Medical History, Past Surgical History, Family history, Social History, and Medications were all reviewed with the patient today and updated as necessary.     Prior to Admission medications    Medication Sig Start Date End Date Taking? Authorizing Provider   NEXPLANON 68 MG implant  10/1/24  Yes Kathie Guzman MD   ARIPiprazole (ABILIFY) 10 MG tablet Take 1 tablet by mouth every evening 24  Yes Kathie Guzman MD

## 2025-01-15 NOTE — PROGRESS NOTES
Patient pre-assessment complete for Tilt Table scheduled for , arrival time 1100. Patient verified using . Patient instructed to bring a list of all home medications on the day of procedure. NPO status reinforced. Instructed they can take all other medications excluding vitamins & supplements. Patient verbalizes understanding of all instructions & denies any questions at this time.

## 2025-01-16 ENCOUNTER — HOSPITAL ENCOUNTER (OUTPATIENT)
Dept: CARDIAC CATH/INVASIVE PROCEDURES | Age: 25
Setting detail: OUTPATIENT SURGERY
Discharge: HOME | End: 2025-01-16
Attending: INTERNAL MEDICINE | Admitting: INTERNAL MEDICINE
Payer: COMMERCIAL

## 2025-01-16 VITALS
SYSTOLIC BLOOD PRESSURE: 132 MMHG | OXYGEN SATURATION: 97 % | BODY MASS INDEX: 39.99 KG/M2 | WEIGHT: 240 LBS | TEMPERATURE: 98 F | RESPIRATION RATE: 16 BRPM | DIASTOLIC BLOOD PRESSURE: 86 MMHG | HEART RATE: 107 BPM | HEIGHT: 65 IN

## 2025-01-16 DIAGNOSIS — R55 SYNCOPE, UNSPECIFIED SYNCOPE TYPE: ICD-10-CM

## 2025-01-16 LAB — ECHO BSA: 2.23 M2

## 2025-01-16 PROCEDURE — 6370000000 HC RX 637 (ALT 250 FOR IP): Performed by: INTERNAL MEDICINE

## 2025-01-16 PROCEDURE — 93660 TILT TABLE EVALUATION: CPT

## 2025-01-16 RX ORDER — NITROGLYCERIN 400 UG/1
SPRAY ORAL PRN
Status: COMPLETED | OUTPATIENT
Start: 2025-01-16 | End: 2025-01-16

## 2025-01-16 RX ADMIN — NITROGLYCERIN 1 SPRAY: 400 SPRAY ORAL at 12:53

## 2025-01-16 NOTE — PROGRESS NOTES
TRANSFER - OUT REPORT:  Tilt  Verbal report given to RN(name) on Yancy Cornejo being transferred to cpru(unit) for routine progression of patient care       Report consisted of patient's Situation, Background, Assessment and   Recommendations(SBAR).     Information from the following report(s) Nurse Handoff Report was reviewed with the receiving nurse.    Opportunity for questions and clarification was provided.

## 2025-01-16 NOTE — DISCHARGE INSTRUCTIONS
After your tilt table test    Be sure someone else drives you home.     You have no restrictions:   Return to your previous diet. Increase your fluid & salt intake   Return to your previous activities.   Continue current medications       Call your doctor if:    You have trouble breathing all of a sudden.  You have chest pain or any pain that spreads to your neck, jaw, or arms.  You have questions or concerns.  You have a fever greater than 101°F.  You have increase in dizzy or fainting spells    Doctor: María Elena    Special Instructions:

## 2025-01-16 NOTE — PROGRESS NOTES
Patient received to CPRU room # 16  Ambulatory from [facility]. Patient scheduled for tilt table today with Dr Gibbs. Procedure reviewed & questions answered, voiced good understanding consent obtained & placed on chart. All medications and medical history reviewed. Will prep patient per orders. Patient & family updated on plan of care.      The patient has a fraility score of 3-MANAGING WELL, based on ambulation.

## 2025-01-23 ENCOUNTER — OFFICE VISIT (OUTPATIENT)
Dept: OBGYN CLINIC | Age: 25
End: 2025-01-23
Payer: COMMERCIAL

## 2025-01-23 VITALS — DIASTOLIC BLOOD PRESSURE: 76 MMHG | BODY MASS INDEX: 42.4 KG/M2 | SYSTOLIC BLOOD PRESSURE: 110 MMHG | WEIGHT: 254.8 LBS

## 2025-01-23 DIAGNOSIS — N92.1 BREAKTHROUGH BLEEDING ON NEXPLANON: Primary | ICD-10-CM

## 2025-01-23 DIAGNOSIS — Z97.5 BREAKTHROUGH BLEEDING ON NEXPLANON: Primary | ICD-10-CM

## 2025-01-23 PROCEDURE — 99213 OFFICE O/P EST LOW 20 MIN: CPT | Performed by: OBSTETRICS & GYNECOLOGY

## 2025-01-23 RX ORDER — ESTRADIOL 2 MG/1
2 TABLET ORAL DAILY
Qty: 7 TABLET | Refills: 0 | Status: SHIPPED | OUTPATIENT
Start: 2025-01-23 | End: 2025-01-30

## 2025-01-23 RX ORDER — ARIPIPRAZOLE 15 MG/1
15 TABLET ORAL DAILY
COMMUNITY
Start: 2025-01-13

## 2025-01-23 RX ORDER — PRAZOSIN HYDROCHLORIDE 2 MG/1
2 CAPSULE ORAL NIGHTLY
COMMUNITY
Start: 2025-01-21

## 2025-01-23 ASSESSMENT — PATIENT HEALTH QUESTIONNAIRE - PHQ9
1. LITTLE INTEREST OR PLEASURE IN DOING THINGS: NEARLY EVERY DAY
SUM OF ALL RESPONSES TO PHQ QUESTIONS 1-9: 4
SUM OF ALL RESPONSES TO PHQ QUESTIONS 1-9: 4
SUM OF ALL RESPONSES TO PHQ9 QUESTIONS 1 & 2: 4
2. FEELING DOWN, DEPRESSED OR HOPELESS: SEVERAL DAYS
SUM OF ALL RESPONSES TO PHQ QUESTIONS 1-9: 4
SUM OF ALL RESPONSES TO PHQ QUESTIONS 1-9: 4

## 2025-01-23 NOTE — PROGRESS NOTES
Gyn followup note  Yancy Cornejo is a 24 y.o. female   who presents as a followup for AUB. She is 5 months . She has a Nexplanon, placed 2024.   Reports menstrual cycles have been regular but this past month lasting 20 days.  She was recently dx with POTS  Reports still some anxiety    Physical Examination:  /76   Wt 115.6 kg (254 lb 12.8 oz)   LMP 2025 (Approximate) Comment: bleeding since  BMI 42.40 kg/m²    Gen: AAOx3  Pulm: normal effort  Skin: no edema    Plan:  --offered expectant vs 1 week of estrogen vs OCP. She will try the 1 week of estrogen  --offered therapy resources if patient desires since she is still 1 year since having a baby

## 2025-02-17 ENCOUNTER — OFFICE VISIT (OUTPATIENT)
Age: 25
End: 2025-02-17
Payer: COMMERCIAL

## 2025-02-17 VITALS
DIASTOLIC BLOOD PRESSURE: 76 MMHG | HEIGHT: 65 IN | HEART RATE: 96 BPM | BODY MASS INDEX: 42.32 KG/M2 | WEIGHT: 254 LBS | SYSTOLIC BLOOD PRESSURE: 122 MMHG

## 2025-02-17 DIAGNOSIS — R55 SYNCOPE, UNSPECIFIED SYNCOPE TYPE: Primary | ICD-10-CM

## 2025-02-17 DIAGNOSIS — G90.A POTS (POSTURAL ORTHOSTATIC TACHYCARDIA SYNDROME): ICD-10-CM

## 2025-02-17 PROCEDURE — 99214 OFFICE O/P EST MOD 30 MIN: CPT | Performed by: INTERNAL MEDICINE

## 2025-02-17 RX ORDER — ACEBUTOLOL HYDROCHLORIDE 200 MG/1
200 CAPSULE ORAL 2 TIMES DAILY
Qty: 60 CAPSULE | Refills: 3 | Status: SHIPPED | OUTPATIENT
Start: 2025-02-17

## 2025-02-17 ASSESSMENT — ENCOUNTER SYMPTOMS
SHORTNESS OF BREATH: 0
ABDOMINAL PAIN: 0

## 2025-02-17 NOTE — PROGRESS NOTES
[facility] CARDIOLOGY  [address] [place] [address], [address]  [place] [address]  PHONE: [phone]      25    NAME:  Yancy Cornejo  : 2000  MRN: [id number]         SUBJECTIVE:   Yancy Cornejo is a 24 y.o. female seen for a follow up visit regarding the following:     Chief Complaint   Patient presents with    Results     Tilt table            HPI:  Follow up  Results (Tilt table)   .    Ms. Cornejo presents today for follow-up.  Tilt table confirmed POTS.  Symptoms are unchanged.  Lots of dizziness, near syncope.  Has not fainted recently.  Reports aggressive hydration and sodium liberalization.  No other acute issues today            Cardiac Medications       Antiadrenergic Antihypertensives       prazosin (MINIPRESS) 2 MG capsule Take 1 capsule by mouth nightly       Beta Blockers Cardio-Selective       acebutolol (SECTRAL) 200 MG capsule Take 1 capsule by mouth 2 times daily                  Past Medical History, Past Surgical History, Family history, Social History, and Medications were all reviewed with the patient today and updated as necessary.     Prior to Admission medications    Medication Sig Start Date End Date Taking? Authorizing Provider   acebutolol (SECTRAL) 200 MG capsule Take 1 capsule by mouth 2 times daily 25  Yes Audie Ring III, MD   ARIPiprazole (ABILIFY) 15 MG tablet Take 1 tablet by mouth daily 25  Yes Kathie Guzman MD   prazosin (MINIPRESS) 2 MG capsule Take 1 capsule by mouth nightly 25  Yes Kathie Guzman MD   NEXPLANON 68 MG implant  10/1/24  Yes Kathie Guzman MD   DULoxetine (CYMBALTA) 30 MG extended release capsule PLEASE SEE ATTACHED FOR DETAILED DIRECTIONS 24  Yes Kathie Guzman MD   albuterol sulfate HFA (VENTOLIN HFA) 108 (90 Base) MCG/ACT inhaler Inhale 2 puffs into the lungs every 4 hours as needed for Wheezing 7/15/24  Yes Isael Esparza MD   hydrOXYzine HCl (ATARAX) 25 MG tablet Take 1 tablet by mouth

## 2025-05-21 ENCOUNTER — OFFICE VISIT (OUTPATIENT)
Age: 25
End: 2025-05-21
Payer: COMMERCIAL

## 2025-05-21 VITALS
WEIGHT: 263 LBS | RESPIRATION RATE: 18 BRPM | HEART RATE: 74 BPM | DIASTOLIC BLOOD PRESSURE: 84 MMHG | SYSTOLIC BLOOD PRESSURE: 118 MMHG | BODY MASS INDEX: 43.77 KG/M2

## 2025-05-21 DIAGNOSIS — G90.A POTS (POSTURAL ORTHOSTATIC TACHYCARDIA SYNDROME): ICD-10-CM

## 2025-05-21 DIAGNOSIS — R55 SYNCOPE, UNSPECIFIED SYNCOPE TYPE: Primary | ICD-10-CM

## 2025-05-21 PROCEDURE — 99214 OFFICE O/P EST MOD 30 MIN: CPT | Performed by: INTERNAL MEDICINE

## 2025-05-21 RX ORDER — FLUDROCORTISONE ACETATE 0.1 MG/1
0.1 TABLET ORAL DAILY
Qty: 90 TABLET | Refills: 0 | Status: SHIPPED | OUTPATIENT
Start: 2025-05-21 | End: 2025-08-19

## 2025-05-21 ASSESSMENT — ENCOUNTER SYMPTOMS
SHORTNESS OF BREATH: 0
ABDOMINAL PAIN: 0

## 2025-05-21 NOTE — PROGRESS NOTES
[facility] CARDIOLOGY  [address] [place] [address], [address]  [place] [address]  PHONE: [phone]      25    NAME:  Yancy Cornejo  : 2000  MRN: [id number]         SUBJECTIVE:   Yancy Cornejo is a 25 y.o. female seen for a follow up visit regarding the following:     Chief Complaint   Patient presents with    syncope     Reports recent chest tightness not feeling well    Follow-up            HPI:  Follow up  syncope (Reports recent chest tightness not feeling well) and Follow-up   .    Ms. Cornejo presents today for follow-up.  Since starting Sectral, symptoms are really no better.  She feels lightheaded and dizzy sitting there.  Has had some fainting spells, not as bad maybe as per prior to starting the therapy but overall does not feel that good.  She notes she is not doing as good a job hydrating as she should.  She continues to have caffeine..  No other acute issues today            Cardiac Medications       Antiadrenergic Antihypertensives       prazosin (MINIPRESS) 2 MG capsule Take 1 capsule by mouth nightly       Beta Blockers Cardio-Selective       acebutolol (SECTRAL) 200 MG capsule Take 1 capsule by mouth 2 times daily                  Past Medical History, Past Surgical History, Family history, Social History, and Medications were all reviewed with the patient today and updated as necessary.     Prior to Admission medications    Medication Sig Start Date End Date Taking? Authorizing Provider   fludrocortisone (FLORINEF) 0.1 MG tablet Take 1 tablet by mouth daily 25 Yes Audie Ring III, MD   acebutolol (SECTRAL) 200 MG capsule Take 1 capsule by mouth 2 times daily 25  Yes Audie Ring III, MD   ARIPiprazole (ABILIFY) 15 MG tablet Take 1 tablet by mouth daily 25  Yes ProviderKathie MD   prazosin (MINIPRESS) 2 MG capsule Take 1 capsule by mouth nightly 25  Yes Kathie Guzman MD   NEXPLANON 68 MG implant  10/1/24  Yes Provider

## 2025-06-16 ENCOUNTER — OFFICE VISIT (OUTPATIENT)
Age: 25
End: 2025-06-16
Payer: COMMERCIAL

## 2025-06-16 VITALS
DIASTOLIC BLOOD PRESSURE: 62 MMHG | WEIGHT: 265 LBS | SYSTOLIC BLOOD PRESSURE: 104 MMHG | HEART RATE: 80 BPM | HEIGHT: 65 IN | BODY MASS INDEX: 44.15 KG/M2

## 2025-06-16 DIAGNOSIS — R55 SYNCOPE, UNSPECIFIED SYNCOPE TYPE: Primary | ICD-10-CM

## 2025-06-16 DIAGNOSIS — G90.A POTS (POSTURAL ORTHOSTATIC TACHYCARDIA SYNDROME): ICD-10-CM

## 2025-06-16 PROCEDURE — 99214 OFFICE O/P EST MOD 30 MIN: CPT | Performed by: INTERNAL MEDICINE

## 2025-06-18 ASSESSMENT — ENCOUNTER SYMPTOMS
SHORTNESS OF BREATH: 0
ABDOMINAL PAIN: 0

## 2025-06-18 NOTE — PROGRESS NOTES
[facility] CARDIOLOGY  [address] [place] [address], [address]  [place] [address]  PHONE: [phone]      25    NAME:  Yancy Cornejo  : 2000  MRN: [id number]         SUBJECTIVE:   Yancy Cornjeo is a 25 y.o. female seen for a follow up visit regarding the following:     Chief Complaint   Patient presents with    Dizziness            HPI:  Follow up  Dizziness   .    Ms. Cornejo presents today for follow-up.  Symptoms have not changed with the addition of Florinef.  Overall, she feels really no different than when for started seeing her despite the addition of Sectral and Florinef and working on increasing her fluid intake. No other acute issues today.  Her mother wonders how much of this could be related to her depression and anxiety medications.    Dizziness  Associated symptoms: no hearing loss and no shortness of breath            Cardiac Medications       Antiadrenergic Antihypertensives       prazosin (MINIPRESS) 2 MG capsule Take 1 capsule by mouth nightly                  Past Medical History, Past Surgical History, Family history, Social History, and Medications were all reviewed with the patient today and updated as necessary.     Prior to Admission medications    Medication Sig Start Date End Date Taking? Authorizing Provider   ARIPiprazole (ABILIFY) 15 MG tablet Take 1 tablet by mouth daily 25  Yes ProviderKathie MD   prazosin (MINIPRESS) 2 MG capsule Take 1 capsule by mouth nightly 25  Yes ProviderKathie MD   NEXPLANON 68 MG implant  10/1/24  Yes ProviderKathie MD   DULoxetine (CYMBALTA) 30 MG extended release capsule Take 1 capsule by mouth daily 24  Yes ProviderKathie MD   albuterol sulfate HFA (VENTOLIN HFA) 108 (90 Base) MCG/ACT inhaler Inhale 2 puffs into the lungs every 4 hours as needed for Wheezing 7/15/24  Yes Isael Esparza MD   hydrOXYzine HCl (ATARAX) 25 MG tablet Take 1 tablet by mouth 3 times daily as needed 24  Yes Provider

## 2025-06-26 ENCOUNTER — PATIENT MESSAGE (OUTPATIENT)
Age: 25
End: 2025-06-26

## 2025-07-02 ENCOUNTER — TELEPHONE (OUTPATIENT)
Age: 25
End: 2025-07-02

## 2025-07-02 NOTE — TELEPHONE ENCOUNTER
Pt calling about her heart hurts. Pt states it feels like squeezing and then releasing and then feels like the heart is racing. Pt states it is a repetative pattern. Pt states getting headaches, nausea, and fatigued. Pt states that this started the day after she saw Dr Ring on 6/16/25 and has gotten worse. Pt is ok but heart racing.

## 2025-07-02 NOTE — TELEPHONE ENCOUNTER
Pt c/o worsening symptoms since seeing Dr. Ring.  States her heart hurts and feels rapid heart beat at times which causes numbness in arms and legs.   Has to lie down to feel better.  Does not know VS now.   She will call our office back with VS when she gets her BP cuff back.  Advised pt if symptoms recur or become severe, proceed to ER for evaluation.

## 2025-07-03 RX ORDER — NEBIVOLOL 2.5 MG/1
2.5 TABLET ORAL DAILY
Qty: 90 TABLET | Refills: 1 | Status: SHIPPED | OUTPATIENT
Start: 2025-07-03

## 2025-07-03 NOTE — TELEPHONE ENCOUNTER
Triage informed pt of Dr. Shepard's response. She verbalizes understanding and agrees to plan. Appt scheduled for 8/7/25 at 2:00 with Dr. Ring at [facility]. Pt confirms appt date, time, and location. Triage eprescribed bystolic to pt requested [facility] pharmacy.

## 2025-07-03 NOTE — TELEPHONE ENCOUNTER
Aggressive hydration and we could have a trial of Bystolic 2.5 mg and then follow-up with María Elena in about a month

## 2025-07-03 NOTE — TELEPHONE ENCOUNTER
Pt called to provide BP/Pulse from last night at [facility]. Her machine at home is not working     134/90 pulse 111  132/97 pulse 109

## 2025-08-07 ENCOUNTER — OFFICE VISIT (OUTPATIENT)
Age: 25
End: 2025-08-07
Payer: COMMERCIAL

## 2025-08-07 VITALS
HEART RATE: 64 BPM | DIASTOLIC BLOOD PRESSURE: 76 MMHG | WEIGHT: 271 LBS | BODY MASS INDEX: 45.15 KG/M2 | HEIGHT: 65 IN | SYSTOLIC BLOOD PRESSURE: 116 MMHG

## 2025-08-07 DIAGNOSIS — G90.A POTS (POSTURAL ORTHOSTATIC TACHYCARDIA SYNDROME): Primary | ICD-10-CM

## 2025-08-07 PROCEDURE — 99214 OFFICE O/P EST MOD 30 MIN: CPT | Performed by: INTERNAL MEDICINE

## 2025-08-07 RX ORDER — LUMATEPERONE 21 MG/1
21 CAPSULE ORAL DAILY
COMMUNITY

## 2025-08-20 ASSESSMENT — ENCOUNTER SYMPTOMS
SHORTNESS OF BREATH: 0
ABDOMINAL PAIN: 0

## 2025-08-22 ENCOUNTER — APPOINTMENT (OUTPATIENT)
Dept: CT IMAGING | Age: 25
End: 2025-08-22
Payer: COMMERCIAL

## 2025-08-22 ENCOUNTER — TELEPHONE (OUTPATIENT)
Age: 25
End: 2025-08-22

## 2025-08-22 ENCOUNTER — PATIENT MESSAGE (OUTPATIENT)
Age: 25
End: 2025-08-22

## 2025-08-22 ENCOUNTER — HOSPITAL ENCOUNTER (EMERGENCY)
Age: 25
Discharge: HOME | End: 2025-08-22
Payer: COMMERCIAL

## 2025-08-22 VITALS
DIASTOLIC BLOOD PRESSURE: 84 MMHG | WEIGHT: 272 LBS | HEART RATE: 91 BPM | SYSTOLIC BLOOD PRESSURE: 116 MMHG | RESPIRATION RATE: 14 BRPM | HEIGHT: 65 IN | TEMPERATURE: 98.8 F | BODY MASS INDEX: 45.32 KG/M2 | OXYGEN SATURATION: 99 %

## 2025-08-22 DIAGNOSIS — R00.0 TACHYCARDIA: Primary | ICD-10-CM

## 2025-08-22 DIAGNOSIS — I51.7 CARDIOMEGALY: ICD-10-CM

## 2025-08-22 DIAGNOSIS — J90 PLEURAL EFFUSION: ICD-10-CM

## 2025-08-22 DIAGNOSIS — G90.A POTS (POSTURAL ORTHOSTATIC TACHYCARDIA SYNDROME): ICD-10-CM

## 2025-08-22 LAB
ALBUMIN SERPL-MCNC: 4.4 G/DL (ref 3.5–5)
ALBUMIN/GLOB SERPL: 1.5 (ref 1–1.9)
ALP SERPL-CCNC: 106 U/L (ref 35–104)
ALT SERPL-CCNC: 20 U/L (ref 12–65)
ANION GAP SERPL CALC-SCNC: 14 MMOL/L (ref 7–16)
APPEARANCE UR: CLEAR
AST SERPL-CCNC: 26 U/L (ref 15–37)
BACTERIA URNS QL MICRO: 0 /HPF
BASOPHILS # BLD: 0.04 K/UL (ref 0–0.2)
BASOPHILS NFR BLD: 0.8 % (ref 0–2)
BILIRUB SERPL-MCNC: 0.4 MG/DL (ref 0–1.2)
BILIRUB UR QL: NEGATIVE
BUN SERPL-MCNC: 14 MG/DL (ref 6–23)
CALCIUM SERPL-MCNC: 9.3 MG/DL (ref 8.8–10.2)
CHLORIDE SERPL-SCNC: 106 MMOL/L (ref 98–107)
CO2 SERPL-SCNC: 20 MMOL/L (ref 20–29)
COLOR UR: YELLOW
CREAT SERPL-MCNC: 0.68 MG/DL (ref 0.8–1.3)
DIFFERENTIAL METHOD BLD: ABNORMAL
EKG ATRIAL RATE: 106 BPM
EKG DIAGNOSIS: NORMAL
EKG P AXIS: 46 DEGREES
EKG P-R INTERVAL: 146 MS
EKG Q-T INTERVAL: 320 MS
EKG QRS DURATION: 81 MS
EKG QTC CALCULATION (BAZETT): 423 MS
EKG R AXIS: 73 DEGREES
EKG T AXIS: 19 DEGREES
EKG VENTRICULAR RATE: 105 BPM
EOSINOPHIL # BLD: 0.1 K/UL (ref 0–0.8)
EOSINOPHIL NFR BLD: 1.9 % (ref 0.5–7.8)
EPI CELLS #/AREA URNS HPF: NORMAL /HPF
ERYTHROCYTE [DISTWIDTH] IN BLOOD BY AUTOMATED COUNT: 12.2 % (ref 11.9–14.6)
GLOBULIN SER CALC-MCNC: 2.9 G/DL (ref 2.3–3.5)
GLUCOSE SERPL-MCNC: 95 MG/DL (ref 70–99)
GLUCOSE UR STRIP.AUTO-MCNC: NEGATIVE MG/DL
HCG UR QL: NEGATIVE
HCT VFR BLD AUTO: 41.2 % (ref 35.8–46.3)
HGB BLD-MCNC: 14.8 G/DL (ref 11.7–15.4)
HGB UR QL STRIP: NEGATIVE
IMM GRANULOCYTES # BLD AUTO: 0.01 K/UL (ref 0–0.5)
IMM GRANULOCYTES NFR BLD AUTO: 0.2 % (ref 0–5)
KETONES UR QL STRIP.AUTO: NEGATIVE MG/DL
LEUKOCYTE ESTERASE UR QL STRIP.AUTO: ABNORMAL
LYMPHOCYTES # BLD: 1.76 K/UL (ref 0.5–4.6)
LYMPHOCYTES NFR BLD: 33.5 % (ref 13–44)
MCH RBC QN AUTO: 30 PG (ref 26.1–32.9)
MCHC RBC AUTO-ENTMCNC: 35.9 G/DL (ref 31.4–35)
MCV RBC AUTO: 83.6 FL (ref 82–102)
MONOCYTES # BLD: 0.52 K/UL (ref 0.1–1.3)
MONOCYTES NFR BLD: 9.9 % (ref 4–12)
MUCOUS THREADS URNS QL MICRO: 0 /LPF
NEUTS SEG # BLD: 2.83 K/UL (ref 1.7–8.2)
NEUTS SEG NFR BLD: 53.7 % (ref 43–78)
NITRITE UR QL STRIP.AUTO: NEGATIVE
NRBC # BLD: 0 K/UL (ref 0–0.2)
OTHER OBSERVATIONS: NORMAL
PH UR STRIP: 7 (ref 5–9)
PLATELET # BLD AUTO: 350 K/UL (ref 150–450)
PMV BLD AUTO: 8.8 FL (ref 9.4–12.3)
POTASSIUM SERPL-SCNC: 3.9 MMOL/L (ref 3.5–5.1)
PROT SERPL-MCNC: 7.3 G/DL (ref 6.3–8.2)
PROT UR STRIP-MCNC: NEGATIVE MG/DL
RBC # BLD AUTO: 4.93 M/UL (ref 4.05–5.2)
RBC #/AREA URNS HPF: NORMAL /HPF
SODIUM SERPL-SCNC: 140 MMOL/L (ref 133–143)
SP GR UR REFRACTOMETRY: 1.02 (ref 1–1.02)
TROPONIN T SERPL HS-MCNC: <6 NG/L (ref 0–14)
TROPONIN T SERPL HS-MCNC: <6 NG/L (ref 0–14)
UROBILINOGEN UR QL STRIP.AUTO: 0.2 EU/DL (ref 0.2–1)
WBC # BLD AUTO: 5.3 K/UL (ref 4.3–11.1)
WBC URNS QL MICRO: NORMAL /HPF

## 2025-08-22 PROCEDURE — 81001 URINALYSIS AUTO W/SCOPE: CPT

## 2025-08-22 PROCEDURE — 81025 URINE PREGNANCY TEST: CPT

## 2025-08-22 PROCEDURE — 99285 EMERGENCY DEPT VISIT HI MDM: CPT

## 2025-08-22 PROCEDURE — 2580000003 HC RX 258: Performed by: PHYSICIAN ASSISTANT

## 2025-08-22 PROCEDURE — 85025 COMPLETE CBC W/AUTO DIFF WBC: CPT

## 2025-08-22 PROCEDURE — 96360 HYDRATION IV INFUSION INIT: CPT

## 2025-08-22 PROCEDURE — 80053 COMPREHEN METABOLIC PANEL: CPT

## 2025-08-22 PROCEDURE — 71260 CT THORAX DX C+: CPT

## 2025-08-22 PROCEDURE — 6360000004 HC RX CONTRAST MEDICATION: Performed by: PHYSICIAN ASSISTANT

## 2025-08-22 PROCEDURE — 93010 ELECTROCARDIOGRAM REPORT: CPT | Performed by: INTERNAL MEDICINE

## 2025-08-22 PROCEDURE — 84484 ASSAY OF TROPONIN QUANT: CPT

## 2025-08-22 PROCEDURE — 96361 HYDRATE IV INFUSION ADD-ON: CPT

## 2025-08-22 PROCEDURE — 93005 ELECTROCARDIOGRAM TRACING: CPT | Performed by: EMERGENCY MEDICINE

## 2025-08-22 RX ORDER — DULOXETIN HYDROCHLORIDE 60 MG/1
60 CAPSULE, DELAYED RELEASE ORAL DAILY
COMMUNITY
Start: 2025-08-20

## 2025-08-22 RX ORDER — IOPAMIDOL 755 MG/ML
100 INJECTION, SOLUTION INTRAVASCULAR
Status: COMPLETED | OUTPATIENT
Start: 2025-08-22 | End: 2025-08-22

## 2025-08-22 RX ORDER — 0.9 % SODIUM CHLORIDE 0.9 %
1000 INTRAVENOUS SOLUTION INTRAVENOUS
Status: COMPLETED | OUTPATIENT
Start: 2025-08-22 | End: 2025-08-22

## 2025-08-22 RX ADMIN — SODIUM CHLORIDE 1000 ML: 0.9 INJECTION, SOLUTION INTRAVENOUS at 17:17

## 2025-08-22 RX ADMIN — IOPAMIDOL 100 ML: 755 INJECTION, SOLUTION INTRAVENOUS at 17:53

## 2025-08-22 ASSESSMENT — LIFESTYLE VARIABLES
HOW MANY STANDARD DRINKS CONTAINING ALCOHOL DO YOU HAVE ON A TYPICAL DAY: PATIENT DOES NOT DRINK
HOW MANY STANDARD DRINKS CONTAINING ALCOHOL DO YOU HAVE ON A TYPICAL DAY: PATIENT DOES NOT DRINK
HOW OFTEN DO YOU HAVE A DRINK CONTAINING ALCOHOL: NEVER

## 2025-08-22 ASSESSMENT — PAIN SCALES - GENERAL: PAINLEVEL_OUTOF10: 5

## 2025-08-22 ASSESSMENT — PAIN - FUNCTIONAL ASSESSMENT
PAIN_FUNCTIONAL_ASSESSMENT: ACTIVITIES ARE NOT PREVENTED
PAIN_FUNCTIONAL_ASSESSMENT: 0-10

## 2025-08-22 ASSESSMENT — PAIN DESCRIPTION - LOCATION: LOCATION: GENERALIZED

## 2025-08-22 ASSESSMENT — PAIN DESCRIPTION - DESCRIPTORS: DESCRIPTORS: ACHING

## 2025-08-25 ENCOUNTER — TELEPHONE (OUTPATIENT)
Age: 25
End: 2025-08-25

## 2025-08-25 RX ORDER — PROPRANOLOL HYDROCHLORIDE 60 MG/1
60 CAPSULE, EXTENDED RELEASE ORAL DAILY
Qty: 30 CAPSULE | Refills: 0 | Status: SHIPPED | OUTPATIENT
Start: 2025-08-25

## (undated) DEVICE — CONTAINER FORMALIN PREFILLED 10% NBF 60ML

## (undated) DEVICE — GAUZE,SPONGE,4"X4",12PLY,WOVEN,NS,LF: Brand: MEDLINE

## (undated) DEVICE — KENDALL RADIOLUCENT FOAM MONITORING ELECTRODE RECTANGULAR SHAPE: Brand: KENDALL

## (undated) DEVICE — BLOCK BITE AD 60FR W/ VELC STRP ADDRESSES MOST PT AND

## (undated) DEVICE — FORCEPS BX L240CM JAW DIA2.8MM L CAP W/ NDL MIC MESH TOOTH

## (undated) DEVICE — CONNECTOR TBNG OD5-7MM O2 END DISP

## (undated) DEVICE — ENDOSCOPIC KIT 1.1+ OP4 CA DE 2 GWN AAMI LEVEL 3

## (undated) DEVICE — AIRLIFE™ OXYGEN TUBING 7 FEET (2.1 M) CRUSH RESISTANT OXYGEN TUBING, VINYL TIPPED: Brand: AIRLIFE™

## (undated) DEVICE — SINGLE PORT MANIFOLD: Brand: NEPTUNE 2

## (undated) DEVICE — MOUTHPIECE ENDOSCP L CTRL OPN AND SIDE PORTS DISP